# Patient Record
Sex: FEMALE | Race: WHITE | NOT HISPANIC OR LATINO | Employment: OTHER | URBAN - METROPOLITAN AREA
[De-identification: names, ages, dates, MRNs, and addresses within clinical notes are randomized per-mention and may not be internally consistent; named-entity substitution may affect disease eponyms.]

---

## 2023-08-31 PROBLEM — H26.9 CATARACT OF RIGHT EYE: Status: ACTIVE | Noted: 2020-11-12

## 2023-08-31 PROBLEM — H02.834 DERMATOCHALASIS OF LEFT UPPER EYELID: Status: ACTIVE | Noted: 2023-08-31

## 2023-08-31 PROBLEM — R51.9 HEADACHE: Status: ACTIVE | Noted: 2023-08-31

## 2023-08-31 PROBLEM — E03.9 HYPOTHYROID: Status: ACTIVE | Noted: 2022-03-09

## 2023-08-31 PROBLEM — E78.5 HYPERLIPIDEMIA: Status: ACTIVE | Noted: 2023-08-31

## 2023-08-31 PROBLEM — N95.9 MENOPAUSAL AND POSTMENOPAUSAL DISORDER: Status: ACTIVE | Noted: 2022-09-14

## 2023-08-31 PROBLEM — E55.9 VITAMIN D DEFICIENCY: Status: ACTIVE | Noted: 2020-08-25

## 2023-08-31 PROBLEM — Z91.89 AT RISK FOR BLEEDING: Status: ACTIVE | Noted: 2023-08-31

## 2023-08-31 PROBLEM — H02.831 DERMATOCHALASIS OF RIGHT UPPER EYELID: Status: ACTIVE | Noted: 2023-08-31

## 2023-08-31 PROBLEM — H53.469 HOMONYMOUS HEMIANOPIA: Status: ACTIVE | Noted: 2023-08-31

## 2023-08-31 PROBLEM — F41.9 ANXIETY: Status: ACTIVE | Noted: 2023-08-31

## 2023-08-31 PROBLEM — G47.33 OBSTRUCTIVE SLEEP APNEA SYNDROME: Status: ACTIVE | Noted: 2023-08-31

## 2023-08-31 PROBLEM — H26.40 SECONDARY CATARACT OF LEFT EYE: Status: ACTIVE | Noted: 2023-08-31

## 2023-08-31 PROBLEM — F32.A ANXIETY AND DEPRESSION: Status: ACTIVE | Noted: 2021-09-17

## 2023-08-31 PROBLEM — Z95.9 CARDIAC DEVICE IN SITU: Status: ACTIVE | Noted: 2023-08-31

## 2023-08-31 PROBLEM — R93.1 ELEVATED CORONARY ARTERY CALCIUM SCORE: Status: ACTIVE | Noted: 2023-08-31

## 2023-08-31 PROBLEM — E11.9 ABNORMAL METABOLIC STATE DUE TO DIABETES MELLITUS (MULTI): Status: ACTIVE | Noted: 2023-08-31

## 2023-08-31 PROBLEM — H52.7 REFRACTIVE ERRORS: Status: ACTIVE | Noted: 2023-08-31

## 2023-08-31 PROBLEM — R77.9 ELEVATED BLOOD PROTEIN: Status: ACTIVE | Noted: 2021-09-20

## 2023-08-31 PROBLEM — I47.10 SVT (SUPRAVENTRICULAR TACHYCARDIA) (CMS-HCC): Status: ACTIVE | Noted: 2023-08-31

## 2023-08-31 PROBLEM — G45.9 TIA (TRANSIENT ISCHEMIC ATTACK): Status: ACTIVE | Noted: 2023-08-31

## 2023-08-31 PROBLEM — E78.5 DYSLIPIDEMIA: Status: ACTIVE | Noted: 2023-08-31

## 2023-08-31 PROBLEM — F41.9 ANXIETY AND DEPRESSION: Status: ACTIVE | Noted: 2021-09-17

## 2023-08-31 PROBLEM — Z96.1 ARTIFICIAL LENS PRESENT: Status: ACTIVE | Noted: 2023-08-31

## 2023-08-31 PROBLEM — I10 BENIGN ESSENTIAL HYPERTENSION: Status: ACTIVE | Noted: 2023-08-31

## 2023-08-31 PROBLEM — H53.8 HAZY VISION: Status: ACTIVE | Noted: 2023-08-31

## 2023-08-31 PROBLEM — R06.2 WHEEZING: Status: ACTIVE | Noted: 2019-09-30

## 2023-08-31 PROBLEM — R73.09 BLOOD GLUCOSE ABNORMAL: Status: ACTIVE | Noted: 2023-08-31

## 2023-08-31 PROBLEM — E53.8 VITAMIN B 12 DEFICIENCY: Status: ACTIVE | Noted: 2022-03-09

## 2023-08-31 PROBLEM — I63.9 CRYPTOGENIC STROKE (MULTI): Status: ACTIVE | Noted: 2023-08-31

## 2023-08-31 PROBLEM — E11.9 DIABETES MELLITUS (MULTI): Status: ACTIVE | Noted: 2021-02-17

## 2023-08-31 PROBLEM — H53.40 LOSS OF PART OF VISUAL FIELD: Status: ACTIVE | Noted: 2023-08-31

## 2023-08-31 PROBLEM — E66.01 SEVERE OBESITY (BMI >= 40) (MULTI): Status: ACTIVE | Noted: 2023-08-31

## 2023-08-31 PROBLEM — J45.909 ASTHMA (HHS-HCC): Status: ACTIVE | Noted: 2023-08-31

## 2023-08-31 PROBLEM — E66.01 MORBID OBESITY (MULTI): Status: ACTIVE | Noted: 2023-08-31

## 2023-08-31 RX ORDER — SEMAGLUTIDE 2.68 MG/ML
2 INJECTION, SOLUTION SUBCUTANEOUS
COMMUNITY
Start: 2023-03-02 | End: 2023-11-21 | Stop reason: ALTCHOICE

## 2023-08-31 RX ORDER — ATORVASTATIN CALCIUM 80 MG/1
TABLET, FILM COATED ORAL
COMMUNITY
End: 2023-11-21 | Stop reason: ALTCHOICE

## 2023-08-31 RX ORDER — BUDESONIDE AND FORMOTEROL FUMARATE DIHYDRATE 160; 4.5 UG/1; UG/1
2 AEROSOL RESPIRATORY (INHALATION)
COMMUNITY
Start: 2023-03-02

## 2023-08-31 RX ORDER — TRIAMTERENE AND HYDROCHLOROTHIAZIDE 75; 50 MG/1; MG/1
1 TABLET ORAL DAILY
COMMUNITY
End: 2023-11-27 | Stop reason: SDUPTHER

## 2023-08-31 RX ORDER — ACETAMINOPHEN 500 MG
1 TABLET ORAL DAILY
COMMUNITY
Start: 2019-12-09 | End: 2023-11-10 | Stop reason: WASHOUT

## 2023-08-31 RX ORDER — ROSUVASTATIN CALCIUM 40 MG/1
1 TABLET, COATED ORAL DAILY
COMMUNITY
Start: 2022-11-08 | End: 2023-11-27 | Stop reason: SDUPTHER

## 2023-08-31 RX ORDER — ALBUTEROL SULFATE 90 UG/1
2 AEROSOL, METERED RESPIRATORY (INHALATION) 3 TIMES DAILY PRN
COMMUNITY
Start: 2023-03-02

## 2023-08-31 RX ORDER — ESCITALOPRAM OXALATE 10 MG/1
1 TABLET ORAL DAILY
COMMUNITY

## 2023-08-31 RX ORDER — SEMAGLUTIDE 1.34 MG/ML
INJECTION, SOLUTION SUBCUTANEOUS
COMMUNITY
End: 2023-11-21 | Stop reason: ALTCHOICE

## 2023-08-31 RX ORDER — LEVOTHYROXINE SODIUM 75 UG/1
1 TABLET ORAL DAILY
COMMUNITY

## 2023-08-31 RX ORDER — AMLODIPINE BESYLATE 5 MG/1
1 TABLET ORAL DAILY
COMMUNITY
End: 2023-11-27 | Stop reason: SDUPTHER

## 2023-08-31 RX ORDER — METFORMIN HYDROCHLORIDE 500 MG/1
2 TABLET ORAL DAILY
COMMUNITY

## 2023-08-31 RX ORDER — FLUTICASONE PROPIONATE AND SALMETEROL 250; 50 UG/1; UG/1
1 POWDER RESPIRATORY (INHALATION)
COMMUNITY
Start: 2019-12-09

## 2023-08-31 RX ORDER — OMEGA-3-ACID ETHYL ESTERS 1 G/1
1 CAPSULE, LIQUID FILLED ORAL 2 TIMES DAILY
COMMUNITY
End: 2023-11-10 | Stop reason: WASHOUT

## 2023-08-31 RX ORDER — CALCIUM CARB, CITRATE, MALATE 250 MG
2 CAPSULE ORAL DAILY
COMMUNITY

## 2023-08-31 RX ORDER — ACETAMINOPHEN 500 MG
TABLET ORAL
COMMUNITY

## 2023-08-31 RX ORDER — CLOPIDOGREL BISULFATE 75 MG/1
75 TABLET ORAL DAILY
COMMUNITY
Start: 2023-03-02

## 2023-09-19 ENCOUNTER — HOSPITAL ENCOUNTER (OUTPATIENT)
Dept: DATA CONVERSION | Facility: HOSPITAL | Age: 68
Discharge: HOME | End: 2023-09-19
Payer: MEDICARE

## 2023-09-19 DIAGNOSIS — E03.9 HYPOTHYROIDISM, UNSPECIFIED: ICD-10-CM

## 2023-09-19 DIAGNOSIS — I10 ESSENTIAL (PRIMARY) HYPERTENSION: ICD-10-CM

## 2023-09-19 DIAGNOSIS — E11.9 TYPE 2 DIABETES MELLITUS WITHOUT COMPLICATIONS (MULTI): ICD-10-CM

## 2023-09-19 LAB
ALBUMIN SERPL-MCNC: 4.2 GM/DL (ref 3.5–5)
ALBUMIN/GLOB SERPL: 1.4 RATIO (ref 1.5–3)
ALP BLD-CCNC: 86 U/L (ref 35–125)
ALT SERPL-CCNC: 20 U/L (ref 5–40)
ANION GAP SERPL CALCULATED.3IONS-SCNC: 14 MMOL/L (ref 0–19)
AST SERPL-CCNC: 24 U/L (ref 5–40)
BILIRUB SERPL-MCNC: 0.2 MG/DL (ref 0.1–1.2)
BUN SERPL-MCNC: 24 MG/DL (ref 8–25)
BUN/CREAT SERPL: 30 RATIO (ref 8–21)
CALCIUM SERPL-MCNC: 9.6 MG/DL (ref 8.5–10.4)
CHLORIDE SERPL-SCNC: 102 MMOL/L (ref 97–107)
CO2 SERPL-SCNC: 26 MMOL/L (ref 24–31)
CREAT SERPL-MCNC: 0.8 MG/DL (ref 0.4–1.6)
CREAT UR-MCNC: 216 MG/DL
GFR SERPL CREATININE-BSD FRML MDRD: 79 ML/MIN/1.73 M2
GLOBULIN SER-MCNC: 2.9 G/DL (ref 1.9–3.7)
GLUCOSE SERPL-MCNC: 109 MG/DL (ref 65–99)
HBA1C MFR BLD: 6 % (ref 4–6)
MAGNESIUM SERPL-MCNC: 2 MG/DL (ref 1.6–3.1)
MICROALBUMIN UR-MCNC: 17 MG/L (ref 0–23)
MICROALBUMIN/CREAT UR: 7.9 MG/G (ref 0–30)
POTASSIUM SERPL-SCNC: 4.4 MMOL/L (ref 3.4–5.1)
PROT SERPL-MCNC: 7.1 G/DL (ref 5.9–7.9)
SODIUM SERPL-SCNC: 141 MMOL/L (ref 133–145)
T3FREE SERPL-MCNC: 2.4 PG/ML (ref 2.3–4.1)
T4 FREE SERPL-MCNC: 1.6 NG/DL (ref 0.9–1.7)
TSH SERPL DL<=0.05 MIU/L-ACNC: 1.65 MIU/L (ref 0.27–4.2)

## 2023-10-03 ENCOUNTER — HOSPITAL ENCOUNTER (OUTPATIENT)
Dept: CARDIOLOGY | Facility: CLINIC | Age: 68
Discharge: HOME | End: 2023-10-03
Payer: MEDICARE

## 2023-10-03 PROCEDURE — 93298 REM INTERROG DEV EVAL SCRMS: CPT

## 2023-10-03 PROCEDURE — 93298 REM INTERROG DEV EVAL SCRMS: CPT | Performed by: INTERNAL MEDICINE

## 2023-11-07 ENCOUNTER — HOSPITAL ENCOUNTER (OUTPATIENT)
Dept: CARDIOLOGY | Facility: CLINIC | Age: 68
Discharge: HOME | End: 2023-11-07
Payer: MEDICARE

## 2023-11-10 ENCOUNTER — APPOINTMENT (OUTPATIENT)
Dept: OPHTHALMOLOGY | Facility: CLINIC | Age: 68
End: 2023-11-10
Payer: MEDICARE

## 2023-11-10 ENCOUNTER — CLINICAL SUPPORT (OUTPATIENT)
Dept: OPHTHALMOLOGY | Facility: CLINIC | Age: 68
End: 2023-11-10
Payer: MEDICARE

## 2023-11-10 ENCOUNTER — OFFICE VISIT (OUTPATIENT)
Dept: OPHTHALMOLOGY | Facility: CLINIC | Age: 68
End: 2023-11-10
Payer: MEDICARE

## 2023-11-10 DIAGNOSIS — H52.7 REFRACTIVE ERRORS: ICD-10-CM

## 2023-11-10 DIAGNOSIS — H26.492 OTHER SECONDARY CATARACT OF LEFT EYE: ICD-10-CM

## 2023-11-10 DIAGNOSIS — E11.9 ABNORMAL METABOLIC STATE DUE TO DIABETES MELLITUS (MULTI): Primary | ICD-10-CM

## 2023-11-10 DIAGNOSIS — Z96.1 ARTIFICIAL LENS PRESENT: ICD-10-CM

## 2023-11-10 PROCEDURE — 92325 MODIFICATION OF CONTACT LENS: CPT | Performed by: OPHTHALMOLOGY

## 2023-11-10 PROCEDURE — 99214 OFFICE O/P EST MOD 30 MIN: CPT | Performed by: OPHTHALMOLOGY

## 2023-11-10 PROCEDURE — 92015 DETERMINE REFRACTIVE STATE: CPT | Performed by: OPHTHALMOLOGY

## 2023-11-10 ASSESSMENT — ENCOUNTER SYMPTOMS
LOSS OF SENSATION IN FEET: 0
NEUROLOGICAL NEGATIVE: 0
CONSTITUTIONAL NEGATIVE: 0
CARDIOVASCULAR NEGATIVE: 0
HEMATOLOGIC/LYMPHATIC NEGATIVE: 0
EYES NEGATIVE: 0
MUSCULOSKELETAL NEGATIVE: 0
DEPRESSION: 0
ENDOCRINE NEGATIVE: 0
RESPIRATORY NEGATIVE: 0
GASTROINTESTINAL NEGATIVE: 0
ALLERGIC/IMMUNOLOGIC NEGATIVE: 0
PSYCHIATRIC NEGATIVE: 0
OCCASIONAL FEELINGS OF UNSTEADINESS: 0

## 2023-11-10 ASSESSMENT — REFRACTION_MANIFEST
OD_AXIS: 005
METHOD_AUTOREFRACTION: 1
OD_CYLINDER: -0.50
OS_CYLINDER: -0.75
OS_AXIS: 175
OD_SPHERE: -4.50
OS_SPHERE: -2.50

## 2023-11-10 ASSESSMENT — KERATOMETRY
OS_AXISANGLE_DEGREES: 80
OD_AXISANGLE2_DEGREES: 5
OS_K2POWER_DIOPTERS: 48.00
OD_K1POWER_DIOPTERS: 47.00
OD_AXISANGLE_DEGREES: 95
OD_K2POWER_DIOPTERS: 47.25
METHOD_AUTO_MANUAL: AUTOMATED
OS_K1POWER_DIOPTERS: 47.25
OS_AXISANGLE2_DEGREES: 170

## 2023-11-10 ASSESSMENT — PATIENT HEALTH QUESTIONNAIRE - PHQ9
SUM OF ALL RESPONSES TO PHQ9 QUESTIONS 1 AND 2: 0
1. LITTLE INTEREST OR PLEASURE IN DOING THINGS: NOT AT ALL
2. FEELING DOWN, DEPRESSED OR HOPELESS: NOT AT ALL

## 2023-11-10 ASSESSMENT — REFRACTION_CURRENTRX
OD_BRAND: ACUVUE 2
OD_BASECURVE: 8.3
OD_DIAMETER: 14.0
OD_SPHERE: -3.50

## 2023-11-10 ASSESSMENT — TONOMETRY
OS_IOP_MMHG: 14
OD_IOP_MMHG: 14
IOP_METHOD: GOLDMANN APPLANATION

## 2023-11-10 ASSESSMENT — PAIN SCALES - GENERAL: PAINLEVEL: 0-NO PAIN

## 2023-11-10 ASSESSMENT — VISUAL ACUITY
CORRECTION_TYPE: CONTACTS
OD_CC: 20/20
METHOD: SNELLEN - SINGLE
OS_SC: J1+

## 2023-11-10 ASSESSMENT — EXTERNAL EXAM - LEFT EYE: OS_EXAM: NORMAL

## 2023-11-10 ASSESSMENT — LEVATOR FUNCTION
OS_LEVATOR: 15
OD_LEVATOR: 15

## 2023-11-10 ASSESSMENT — MARGIN REFLEX DISTANCE
OD_MRD1: 3.0
OS_MRD1: 3.0

## 2023-11-10 ASSESSMENT — REFRACTION_WEARINGRX
OS_CYLINDER: -0.75
OD_SPHERE: -3.50
OS_SPHERE: -2.25
OS_AXIS: 175
OD_CYLINDER: -0.75
OD_AXIS: 007

## 2023-11-10 ASSESSMENT — SLIT LAMP EXAM - LIDS
COMMENTS: NORMAL
COMMENTS: NORMAL

## 2023-11-10 ASSESSMENT — CUP TO DISC RATIO
OS_RATIO: 0.35
OD_RATIO: 0.4

## 2023-11-10 ASSESSMENT — EXTERNAL EXAM - RIGHT EYE: OD_EXAM: NORMAL

## 2023-11-10 NOTE — LETTER
November 10, 2023    Char Lew MD  [address]  [place] [address]    Patient: Uyen Naranjo   YOB: 1955   Date of Visit: 11/10/2023       Dear Dr. Char Lew MD:    Thank you for referring Uyen Naranjo to me for evaluation. Here is my assessment and plan of care:    Assessment/Plan:  Uyen was seen today for annual exam.  Diagnoses and all orders for this visit:  Abnormal metabolic state due to diabetes mellitus (CMS/HCC) (Primary)  Other secondary cataract of left eye  Artificial lens present  Refractive errors    Right eye:     Left eye:    [x] No diabetes mellitus (DM) retinopathy [x] No diabetes mellitus (DM) retinopathy    [] Mild non-proliferative retinopathy [] Mild non-proliferative retinopathy    [] Moderate non-proliferative retinopathy [] Moderate non-proliferative retinopathy    [] Severe non-proliferative retinopathy [] Severe non-proliferative retinopathy    [] Proliferative retinopathy   [] Proliferative retinopathy    [] Diabetic macular edema   [] Diabetic macular edema    Urged patient to continue to work on best blood sugar and blood pressure control  Advised patient to call if any new vision changes noted    Will plan to repeat evaluation in:   [] 3 months [] 6 months [] 9 months [x] 12 months [] Other      Below you can find relevant pieces of the exam. If you have questions, please do not hesitate to call me. I look forward to following Uyen along with you.         Sincerely,        Roverto Syed MD        CC:   No Recipients         External Exam         Right Left    External Normal Normal    Palpebral fissure 7 mm 7 mm    MRD1 3.0 mm 3.0 mm    Levator 15 mm 15 mm              Slit Lamp Exam         Right Left    Lids/Lashes Normal Normal    Conjunctiva/Sclera White and quiet White and quiet    Cornea Clear Clear    Anterior Chamber Deep and quiet Deep and quiet    Iris Round and reactive Round and reactive    Lens S/P  "YAG capsulotomy, Posterior chamber intraocular lens cloudy posterior capsule, Posterior chamber intraocular lens              Fundus Exam         Right Left    Vitreous vitreous clear and normal vitreous clear and normal    Disc Normal Normal    C/D Ratio 0.4 0.35    Macula no diabetic retinopathy no diabetic retinopathy    Vessels no diabetic retinopathy no diabetic retinopathy    Periphery normal retinal periphery normal retinal periphery                   <div id=\"MAIN_EXAM_REVIEWED\"></div>     "

## 2023-11-10 NOTE — PROGRESS NOTES
Assessment/Plan   Problem List Items Addressed This Visit          Eye/Vision problems    Secondary cataract of left eye     Mild PCO left eye (OS) but non significant, will monitor with serial exam.          Refractive errors     Discussed glasses prescription from refraction. Will provide if patient interested in keeping for records or to fill as a new set of glasses.            Abnormal metabolic state due to diabetes mellitus (CMS/[facility]) - Primary     Advised no signs of diabetic changes on exam. Recommend to continue best sugar control and on need for annual checkup. Understands role of good BP control and weight loss if applicable. Discussed some components of selected studies like WESDR, DCCT, and UKPDS to describe the likely course of diabetes and effects on the eyes.           Artificial lens present     Stable intraocular lens (IOL) both eyes (OU), well treated PCO right eye (OD) with YAG, mild PCO left eye (OS). Will monitor with serial exam.             Provided reassurance regarding above diagnoses and care received in the office visit today. Discussed outcomes and options along with the importance of treatment compliance. Understands the importance of any follow up visits. Patient instructed to call/communicate with our office if any new issues, questions, or concerns.     Will plan to see back in 1 year for full or sooner PRN

## 2023-11-10 NOTE — PATIENT INSTRUCTIONS
Thank you so much for choosing me to provide your care today!    If you were dilated your vision may remain blurry   or light sensitive for several hours.    The nature of eye and vision problems can require frequent follow up, please make every effort to adhere to any future appointments.    If you have any issues, questions, or concerns,   please do not hesitate to reach out.    If you receive a survey in regards to your care today, please mention any exceptional care my office staff and/or technicians provided.    You can reach our office at this number:  [phone]

## 2023-11-21 ENCOUNTER — OFFICE VISIT (OUTPATIENT)
Dept: CARDIOLOGY | Facility: CLINIC | Age: 68
End: 2023-11-21
Payer: MEDICARE

## 2023-11-21 VITALS
HEART RATE: 84 BPM | DIASTOLIC BLOOD PRESSURE: 70 MMHG | BODY MASS INDEX: 44.14 KG/M2 | OXYGEN SATURATION: 96 % | WEIGHT: 226 LBS | SYSTOLIC BLOOD PRESSURE: 120 MMHG

## 2023-11-21 DIAGNOSIS — E78.2 MIXED HYPERLIPIDEMIA: ICD-10-CM

## 2023-11-21 DIAGNOSIS — I10 BENIGN ESSENTIAL HYPERTENSION: ICD-10-CM

## 2023-11-21 DIAGNOSIS — I63.9 CRYPTOGENIC STROKE (MULTI): ICD-10-CM

## 2023-11-21 DIAGNOSIS — R93.1 ELEVATED CORONARY ARTERY CALCIUM SCORE: Primary | ICD-10-CM

## 2023-11-21 PROCEDURE — 3078F DIAST BP <80 MM HG: CPT | Performed by: INTERNAL MEDICINE

## 2023-11-21 PROCEDURE — 1126F AMNT PAIN NOTED NONE PRSNT: CPT | Performed by: INTERNAL MEDICINE

## 2023-11-21 PROCEDURE — 3044F HG A1C LEVEL LT 7.0%: CPT | Performed by: INTERNAL MEDICINE

## 2023-11-21 PROCEDURE — 99213 OFFICE O/P EST LOW 20 MIN: CPT | Performed by: INTERNAL MEDICINE

## 2023-11-21 PROCEDURE — 1160F RVW MEDS BY RX/DR IN RCRD: CPT | Performed by: INTERNAL MEDICINE

## 2023-11-21 PROCEDURE — 1036F TOBACCO NON-USER: CPT | Performed by: INTERNAL MEDICINE

## 2023-11-21 PROCEDURE — 3074F SYST BP LT 130 MM HG: CPT | Performed by: INTERNAL MEDICINE

## 2023-11-21 PROCEDURE — 1159F MED LIST DOCD IN RCRD: CPT | Performed by: INTERNAL MEDICINE

## 2023-11-21 ASSESSMENT — PAIN SCALES - GENERAL: PAINLEVEL: 0-NO PAIN

## 2023-11-21 ASSESSMENT — ENCOUNTER SYMPTOMS
DYSPNEA ON EXERTION: 0
ABDOMINAL PAIN: 0
COUGH: 0
HEMATURIA: 0
BLURRED VISION: 0
SHORTNESS OF BREATH: 0
NUMBNESS: 0
PARESTHESIAS: 0
DYSURIA: 0
PALPITATIONS: 0

## 2023-11-21 NOTE — PROGRESS NOTES
Subjective   Uyen Naranjo is a 68 y.o. female.    Chief Complaint:  Follow-up (Crestor refill to [facility] - 90)    HPI  Feels very good.  No palpitations, no chest pains.  Active.  Review of Systems   Constitutional: Negative for malaise/fatigue.   HENT:  Negative for congestion.    Eyes:  Negative for blurred vision.   Cardiovascular:  Negative for chest pain, dyspnea on exertion and palpitations.   Respiratory:  Negative for cough and shortness of breath.    Musculoskeletal:  Negative for joint pain.   Gastrointestinal:  Negative for abdominal pain.   Genitourinary:  Negative for dysuria and hematuria.   Neurological:  Negative for numbness and paresthesias.       Objective   Constitutional:       Appearance: Not in distress.   Eyes:      Conjunctiva/sclera: Conjunctivae normal.   Neck:      Vascular: JVD normal.   Pulmonary:      Breath sounds: Normal breath sounds. No wheezing. No rhonchi. No rales.   Cardiovascular:      Normal rate. Regular rhythm.      Murmurs: There is no murmur.      No gallop.  No click. No rub.   Abdominal:      Palpations: Abdomen is soft.   Neurological:      General: No focal deficit present.      Mental Status: Alert.                 Lab Review:       Assessment/Plan   The primary encounter diagnosis was Elevated coronary artery calcium score. Diagnoses of Benign essential hypertension, Mixed hyperlipidemia, and Cryptogenic stroke (CMS/HCC) were also pertinent to this visit.    Cryptogenic stroke (CMS/HCC)  Dr. Dodd did implant LINQ monitor.    Elevated coronary artery calcium score  Continue standard risk factor modification    Hyperlipidemia  LDL 51 on last check, very good.  Recheck on return.

## 2023-11-27 DIAGNOSIS — I10 BENIGN ESSENTIAL HYPERTENSION: ICD-10-CM

## 2023-11-27 DIAGNOSIS — E78.2 MIXED HYPERLIPIDEMIA: Primary | ICD-10-CM

## 2023-11-27 RX ORDER — TRIAMTERENE AND HYDROCHLOROTHIAZIDE 75; 50 MG/1; MG/1
1 TABLET ORAL DAILY
Qty: 90 TABLET | Refills: 3 | Status: SHIPPED | OUTPATIENT
Start: 2023-11-27

## 2023-11-27 RX ORDER — ROSUVASTATIN CALCIUM 40 MG/1
40 TABLET, COATED ORAL DAILY
Qty: 90 TABLET | Refills: 3 | Status: SHIPPED | OUTPATIENT
Start: 2023-11-27

## 2023-11-27 RX ORDER — AMLODIPINE BESYLATE 5 MG/1
5 TABLET ORAL DAILY
Qty: 90 TABLET | Refills: 3 | Status: SHIPPED | OUTPATIENT
Start: 2023-11-27

## 2023-12-05 ENCOUNTER — APPOINTMENT (OUTPATIENT)
Dept: RADIOLOGY | Facility: HOSPITAL | Age: 68
End: 2023-12-05
Payer: MEDICARE

## 2023-12-21 ENCOUNTER — HOSPITAL ENCOUNTER (OUTPATIENT)
Dept: RADIOLOGY | Facility: HOSPITAL | Age: 68
Discharge: HOME | End: 2023-12-21
Payer: MEDICARE

## 2023-12-21 VITALS — BODY MASS INDEX: 41.91 KG/M2 | HEIGHT: 61 IN | WEIGHT: 222 LBS

## 2023-12-21 DIAGNOSIS — Z12.31 ENCOUNTER FOR SCREENING MAMMOGRAM FOR MALIGNANT NEOPLASM OF BREAST: ICD-10-CM

## 2023-12-21 PROCEDURE — 77063 BREAST TOMOSYNTHESIS BI: CPT

## 2023-12-21 PROCEDURE — 77067 SCR MAMMO BI INCL CAD: CPT

## 2024-02-22 NOTE — ASSESSMENT & PLAN NOTE
Stable intraocular lens (IOL) both eyes (OU), well treated PCO right eye (OD) with YAG, mild PCO left eye (OS). Will monitor with serial exam.    Cell Phone/PDA (specify)/Clothing

## 2024-04-05 ENCOUNTER — HOSPITAL ENCOUNTER (EMERGENCY)
Facility: HOSPITAL | Age: 69
Discharge: HOME | End: 2024-04-05
Attending: EMERGENCY MEDICINE
Payer: MEDICARE

## 2024-04-05 VITALS
HEART RATE: 80 BPM | RESPIRATION RATE: 17 BRPM | TEMPERATURE: 97.9 F | SYSTOLIC BLOOD PRESSURE: 138 MMHG | BODY MASS INDEX: 44.02 KG/M2 | WEIGHT: 224.21 LBS | HEIGHT: 60 IN | OXYGEN SATURATION: 98 % | DIASTOLIC BLOOD PRESSURE: 80 MMHG

## 2024-04-05 DIAGNOSIS — M43.6 TORTICOLLIS, ACUTE: Primary | ICD-10-CM

## 2024-04-05 PROCEDURE — 96372 THER/PROPH/DIAG INJ SC/IM: CPT | Performed by: EMERGENCY MEDICINE

## 2024-04-05 PROCEDURE — 99283 EMERGENCY DEPT VISIT LOW MDM: CPT

## 2024-04-05 PROCEDURE — 2500000005 HC RX 250 GENERAL PHARMACY W/O HCPCS: Performed by: EMERGENCY MEDICINE

## 2024-04-05 PROCEDURE — 2500000004 HC RX 250 GENERAL PHARMACY W/ HCPCS (ALT 636 FOR OP/ED): Performed by: EMERGENCY MEDICINE

## 2024-04-05 RX ORDER — ONDANSETRON 4 MG/1
4 TABLET, ORALLY DISINTEGRATING ORAL ONCE
Status: COMPLETED | OUTPATIENT
Start: 2024-04-05 | End: 2024-04-05

## 2024-04-05 RX ORDER — ORPHENADRINE CITRATE 30 MG/ML
30 INJECTION INTRAMUSCULAR; INTRAVENOUS ONCE
Status: COMPLETED | OUTPATIENT
Start: 2024-04-05 | End: 2024-04-05

## 2024-04-05 RX ORDER — METHOCARBAMOL 500 MG/1
500 TABLET, FILM COATED ORAL 3 TIMES DAILY PRN
Qty: 20 TABLET | Refills: 0 | Status: SHIPPED | OUTPATIENT
Start: 2024-04-05 | End: 2024-05-08 | Stop reason: ALTCHOICE

## 2024-04-05 RX ORDER — KETOROLAC TROMETHAMINE 30 MG/ML
15 INJECTION, SOLUTION INTRAMUSCULAR; INTRAVENOUS ONCE
Status: COMPLETED | OUTPATIENT
Start: 2024-04-05 | End: 2024-04-05

## 2024-04-05 RX ORDER — LIDOCAINE 560 MG/1
1 PATCH PERCUTANEOUS; TOPICAL; TRANSDERMAL ONCE
Status: DISCONTINUED | OUTPATIENT
Start: 2024-04-05 | End: 2024-04-05 | Stop reason: HOSPADM

## 2024-04-05 RX ADMIN — ONDANSETRON 4 MG: 4 TABLET, ORALLY DISINTEGRATING ORAL at 20:55

## 2024-04-05 RX ADMIN — KETOROLAC TROMETHAMINE 15 MG: 30 INJECTION, SOLUTION INTRAMUSCULAR at 20:52

## 2024-04-05 RX ADMIN — LIDOCAINE 1 PATCH: 4 PATCH TOPICAL at 20:53

## 2024-04-05 RX ADMIN — ORPHENADRINE CITRATE 30 MG: 60 INJECTION INTRAMUSCULAR; INTRAVENOUS at 20:52

## 2024-04-05 ASSESSMENT — PAIN DESCRIPTION - FREQUENCY: FREQUENCY: CONSTANT/CONTINUOUS

## 2024-04-05 ASSESSMENT — PAIN SCALES - GENERAL
PAINLEVEL_OUTOF10: 4
PAINLEVEL_OUTOF10: 7

## 2024-04-05 ASSESSMENT — COLUMBIA-SUICIDE SEVERITY RATING SCALE - C-SSRS
1. IN THE PAST MONTH, HAVE YOU WISHED YOU WERE DEAD OR WISHED YOU COULD GO TO SLEEP AND NOT WAKE UP?: NO
6. HAVE YOU EVER DONE ANYTHING, STARTED TO DO ANYTHING, OR PREPARED TO DO ANYTHING TO END YOUR LIFE?: NO
2. HAVE YOU ACTUALLY HAD ANY THOUGHTS OF KILLING YOURSELF?: NO

## 2024-04-05 ASSESSMENT — PAIN DESCRIPTION - LOCATION: LOCATION: NECK

## 2024-04-05 ASSESSMENT — PAIN DESCRIPTION - ORIENTATION: ORIENTATION: RIGHT

## 2024-04-05 ASSESSMENT — PAIN DESCRIPTION - DESCRIPTORS: DESCRIPTORS: ACHING

## 2024-04-05 ASSESSMENT — PAIN - FUNCTIONAL ASSESSMENT
PAIN_FUNCTIONAL_ASSESSMENT: 0-10
PAIN_FUNCTIONAL_ASSESSMENT: 0-10

## 2024-04-05 ASSESSMENT — PAIN DESCRIPTION - PAIN TYPE: TYPE: ACUTE PAIN

## 2024-04-05 ASSESSMENT — PAIN DESCRIPTION - PROGRESSION: CLINICAL_PROGRESSION: GRADUALLY WORSENING

## 2024-04-05 ASSESSMENT — PAIN DESCRIPTION - ONSET: ONSET: SUDDEN

## 2024-04-06 NOTE — ED PROVIDER NOTES
HPI   Chief Complaint   Patient presents with    Neck Pain     Pt has had a stiff neck since Wednesday night. Pt on Thursday had to slam on her brakes and it tweaked her neck more. Pt now feels dizzy whenever she stands up.       HPI       69-year-old female with right-sided neck pain.  Patient woke up from a nap on Wednesday with tightness and pain from the base of the skull on the right side down towards the shoulder.  On Thursday she states that she had a several break suddenly but did not have any pain or discomfort with that.  Over the course of the last 24 hours had increasing pain and spasm to the right neck.  States she cannot get comfortable.  Has tried ibuprofen heat and Biofreeze with no relief.  No radiation of pain.  No paresthesias.  No headache             No data recorded                   Patient History   Past Medical History:   Diagnosis Date    Dermatochalasis of both upper eyelids     Diabetes mellitus without complication (CMS/HCC)     Disorder of lipoprotein metabolism, unspecified     Elevated serum cholesterol    History of YAG laser capsulotomy of lens of right eye         Personal history of other diseases of the circulatory system     History of hypertension    Personal history of other diseases of the nervous system and sense organs     History of sleep apnea    Personal history of other endocrine, nutritional and metabolic disease     History of diabetes mellitus    Posterior capsular opacification, left eye     Unspecified disorder of refraction      Past Surgical History:   Procedure Laterality Date    CATARACT EXTRACTION W/  INTRAOCULAR LENS IMPLANT Bilateral     OD 2007 +14.5D, OS 2020 +16.5D    MR HEAD ANGIO WO IV CONTRAST  2020    MR HEAD ANGIO WO IV CONTRAST [facility]    OTHER SURGICAL HISTORY  2021     section    OTHER SURGICAL HISTORY  2022    Foot surgery    OTHER SURGICAL HISTORY  2022    Tubal ligation    OTHER  SURGICAL HISTORY  2022    Eye surgery    OTHER SURGICAL HISTORY  2022    Leg surgery    OTHER SURGICAL HISTORY  2022    Hip surgery     Family History   Problem Relation Name Age of Onset    Cancer Mother Lisseth Albert     Colon cancer Mother Lisseth Albert     Heart disease Father      Kidney disease Father      Cancer Father       Social History     Tobacco Use    Smoking status: Former     Packs/day: 1     Types: Cigarettes     Quit date: 2009     Years since quittin.8    Smokeless tobacco: Never   Vaping Use    Vaping Use: Never used   Substance Use Topics    Alcohol use: Yes    Drug use: Yes     Types: Marijuana       Physical Exam   ED Triage Vitals [24]   Temperature Heart Rate Respirations BP   36.6 °C (97.9 °F) 98 16 152/80      Pulse Ox Temp Source Heart Rate Source Patient Position   96 % Temporal Monitor Sitting      BP Location FiO2 (%)     Right arm --       Physical Exam    Gen:  Well nourished, no acute distress  Head: Normocephalic, atraumatic  Eyes: PERRL, EOMI, conjunctiva clear  ENT: External ears and nose normal, OP clear, Mucosa moist  Neck: Has pain with lateral movements.  Tenderness and spasm right paracervical and trapezius muscles, no focal midline tenderness step-off or deformity  Chest: No tenderness, no crepitus  CV: Regular rate and rhythm, no Murmur  Lungs: Clear bilaterally, no distress  Abd: No tenderness, no rebound or guarding  Extremities: FROM, no edema  Neuro: Cranial nerves intact, moving all 4 extremities, A&O x 4, strength out of 5 in all 4 extremities, equal  strength, normal sensation to soft touch  Psych: Appropriate behavior and affect  Back: No focal tenderness, no CVA tenderness  Skin: No rashes or lesions noted    ED Course & MDM   Diagnoses as of 24   Torticollis, acute     Received IM Toradol and Norflex here as well as Lidoderm patch.  On reevaluation states she is feeling better.  Has some more movement of  the neck.  Will discharge home with anti-inflammatories.  Continue NSAIDs and heat to the area.  No signs of cervical spine injury, arterial injury or fracture.  Discharge home and follow-up with PCP  Medical Decision Making      Procedure  Procedures     Ahmet Alejandro MD  04/05/24 [id number]

## 2024-04-06 NOTE — DISCHARGE INSTRUCTIONS
Heat and gentle stretching at home.  Ibuprofen or Aleve and Tylenol as needed.  Return to ER for new or worsening symptoms

## 2024-04-25 ENCOUNTER — LAB (OUTPATIENT)
Dept: LAB | Facility: LAB | Age: 69
End: 2024-04-25
Payer: MEDICARE

## 2024-04-25 DIAGNOSIS — E78.2 MIXED HYPERLIPIDEMIA: ICD-10-CM

## 2024-04-25 LAB
CHOLEST SERPL-MCNC: 155 MG/DL (ref 133–200)
CHOLEST/HDLC SERPL: 2.1 {RATIO}
HDLC SERPL-MCNC: 75 MG/DL
LDLC SERPL CALC-MCNC: 55 MG/DL (ref 65–130)
TRIGL SERPL-MCNC: 124 MG/DL (ref 40–150)

## 2024-04-25 PROCEDURE — 80061 LIPID PANEL: CPT

## 2024-04-25 PROCEDURE — 36415 COLL VENOUS BLD VENIPUNCTURE: CPT

## 2024-05-03 ENCOUNTER — HOSPITAL ENCOUNTER (OUTPATIENT)
Dept: CARDIOLOGY | Facility: CLINIC | Age: 69
Discharge: HOME | End: 2024-05-03
Payer: MEDICARE

## 2024-05-03 DIAGNOSIS — Z45.09 ENCOUNTER FOR LOOP RECORDER CHECK: ICD-10-CM

## 2024-05-03 DIAGNOSIS — I63.9 CRYPTOGENIC STROKE (MULTI): ICD-10-CM

## 2024-05-07 PROBLEM — E78.5 DYSLIPIDEMIA: Status: RESOLVED | Noted: 2023-08-31 | Resolved: 2024-05-07

## 2024-05-08 ENCOUNTER — OFFICE VISIT (OUTPATIENT)
Dept: CARDIOLOGY | Facility: CLINIC | Age: 69
End: 2024-05-08
Payer: MEDICARE

## 2024-05-08 VITALS
DIASTOLIC BLOOD PRESSURE: 60 MMHG | WEIGHT: 221 LBS | SYSTOLIC BLOOD PRESSURE: 100 MMHG | HEART RATE: 84 BPM | BODY MASS INDEX: 43.16 KG/M2

## 2024-05-08 DIAGNOSIS — I10 BENIGN ESSENTIAL HYPERTENSION: ICD-10-CM

## 2024-05-08 DIAGNOSIS — I47.10 SVT (SUPRAVENTRICULAR TACHYCARDIA) (CMS-HCC): ICD-10-CM

## 2024-05-08 DIAGNOSIS — Z95.9 CARDIAC DEVICE IN SITU: ICD-10-CM

## 2024-05-08 DIAGNOSIS — I63.9 CRYPTOGENIC STROKE (MULTI): ICD-10-CM

## 2024-05-08 DIAGNOSIS — R93.1 ELEVATED CORONARY ARTERY CALCIUM SCORE: Primary | ICD-10-CM

## 2024-05-08 DIAGNOSIS — E78.2 MIXED HYPERLIPIDEMIA: ICD-10-CM

## 2024-05-08 PROCEDURE — 3048F LDL-C <100 MG/DL: CPT | Performed by: INTERNAL MEDICINE

## 2024-05-08 PROCEDURE — 1159F MED LIST DOCD IN RCRD: CPT | Performed by: INTERNAL MEDICINE

## 2024-05-08 PROCEDURE — 99214 OFFICE O/P EST MOD 30 MIN: CPT | Performed by: INTERNAL MEDICINE

## 2024-05-08 PROCEDURE — 1036F TOBACCO NON-USER: CPT | Performed by: INTERNAL MEDICINE

## 2024-05-08 PROCEDURE — 3078F DIAST BP <80 MM HG: CPT | Performed by: INTERNAL MEDICINE

## 2024-05-08 PROCEDURE — 1157F ADVNC CARE PLAN IN RCRD: CPT | Performed by: INTERNAL MEDICINE

## 2024-05-08 PROCEDURE — 1126F AMNT PAIN NOTED NONE PRSNT: CPT | Performed by: INTERNAL MEDICINE

## 2024-05-08 PROCEDURE — 3074F SYST BP LT 130 MM HG: CPT | Performed by: INTERNAL MEDICINE

## 2024-05-08 ASSESSMENT — ENCOUNTER SYMPTOMS
BLURRED VISION: 0
NUMBNESS: 0
HEMATURIA: 0
ABDOMINAL PAIN: 0
PALPITATIONS: 0
SHORTNESS OF BREATH: 0
COUGH: 0
PARESTHESIAS: 0
DYSPNEA ON EXERTION: 0
DYSURIA: 0

## 2024-05-08 ASSESSMENT — PAIN SCALES - GENERAL: PAINLEVEL: 0-NO PAIN

## 2024-05-08 NOTE — PROGRESS NOTES
Subjective   Uyen Naranjo is a 69 y.o. female.    Chief Complaint:  Elevated coronary artery calcium score (6 month f/u)    HPI  Overall feels great.  Couple weeks ago she did have a slight twinge in her left chest that only lasted for a second.  No other recurrences overall she feels quite well.    Review of Systems   Constitutional: Negative for malaise/fatigue.   HENT:  Negative for congestion.    Eyes:  Negative for blurred vision.   Cardiovascular:  Negative for chest pain, dyspnea on exertion and palpitations.   Respiratory:  Negative for cough and shortness of breath.    Musculoskeletal:  Negative for joint pain.   Gastrointestinal:  Negative for abdominal pain.   Genitourinary:  Negative for dysuria and hematuria.   Neurological:  Negative for numbness and paresthesias.       Objective   Constitutional:       Appearance: Not in distress.   Eyes:      Conjunctiva/sclera: Conjunctivae normal.   Neck:      Vascular: JVD normal.   Pulmonary:      Breath sounds: Normal breath sounds. No wheezing. No rhonchi. No rales.   Cardiovascular:      Normal rate. Regular rhythm.      Murmurs: There is no murmur.      No gallop.  No click. No rub.   Abdominal:      Palpations: Abdomen is soft.   Neurological:      General: No focal deficit present.      Mental Status: Alert.         Lab Review:   Lab on 04/25/2024   Component Date Value    Cholesterol 04/25/2024 155     HDL-Cholesterol 04/25/2024 75.0     Cholesterol/HDL Ratio 04/25/2024 2.1     LDL Calculated 04/25/2024 55 (L)     Triglycerides 04/25/2024 124        Assessment/Plan   The primary encounter diagnosis was Elevated coronary artery calcium score. Diagnoses of Mixed hyperlipidemia, SVT (supraventricular tachycardia) (CMS-HCC), Benign essential hypertension, Cardiac device in situ, and Cryptogenic stroke (Multi) were also pertinent to this visit.    Benign essential hypertension  Blood pressure very well-controlled.  No changes need to be made at this  time.    Elevated coronary artery calcium score  Stable with no anginal type symptoms.  Will continue standard risk factor modification and follow on a clinical basis.    Hyperlipidemia  Reviewed lipid panel.  LDL cholesterol all the way down to 55, excellent.  Continue the rosuvastatin at 40 mg daily.  Plan on rechecking lipid panel approximately 1 year.    SVT (supraventricular tachycardia) (CMS-HCC)  No symptomatic palpitations.  Will continue to follow clinically.    Cryptogenic stroke (Multi)  Continue clopidogrel and follow clinically.

## 2024-05-08 NOTE — ASSESSMENT & PLAN NOTE
Stable with no anginal type symptoms.  Will continue standard risk factor modification and follow on a clinical basis.

## 2024-05-08 NOTE — ASSESSMENT & PLAN NOTE
Reviewed lipid panel.  LDL cholesterol all the way down to 55, excellent.  Continue the rosuvastatin at 40 mg daily.  Plan on rechecking lipid panel approximately 1 year.

## 2024-06-10 ENCOUNTER — HOSPITAL ENCOUNTER (OUTPATIENT)
Dept: CARDIOLOGY | Facility: CLINIC | Age: 69
Discharge: HOME | End: 2024-06-10
Payer: MEDICARE

## 2024-06-10 DIAGNOSIS — Z45.09 ENCOUNTER FOR LOOP RECORDER CHECK: ICD-10-CM

## 2024-06-10 DIAGNOSIS — I63.9 CRYPTOGENIC STROKE (MULTI): ICD-10-CM

## 2024-09-10 ENCOUNTER — TELEPHONE (OUTPATIENT)
Dept: OTOLARYNGOLOGY | Facility: CLINIC | Age: 69
End: 2024-09-10
Payer: MEDICARE

## 2024-09-11 DIAGNOSIS — E78.2 MIXED HYPERLIPIDEMIA: ICD-10-CM

## 2024-09-11 DIAGNOSIS — I10 BENIGN ESSENTIAL HYPERTENSION: ICD-10-CM

## 2024-09-12 RX ORDER — AMLODIPINE BESYLATE 5 MG/1
5 TABLET ORAL DAILY
Qty: 90 TABLET | Refills: 3 | Status: SHIPPED | OUTPATIENT
Start: 2024-09-12

## 2024-09-12 RX ORDER — ROSUVASTATIN CALCIUM 40 MG/1
40 TABLET, COATED ORAL DAILY
Qty: 90 TABLET | Refills: 3 | Status: SHIPPED | OUTPATIENT
Start: 2024-09-12 | End: 2025-09-12

## 2024-11-15 ENCOUNTER — APPOINTMENT (OUTPATIENT)
Dept: OPHTHALMOLOGY | Facility: CLINIC | Age: 69
End: 2024-11-15
Payer: MEDICARE

## 2024-11-20 ENCOUNTER — OFFICE VISIT (OUTPATIENT)
Dept: OPHTHALMOLOGY | Facility: CLINIC | Age: 69
End: 2024-11-20
Payer: MEDICARE

## 2024-11-20 DIAGNOSIS — H26.492 OTHER SECONDARY CATARACT OF LEFT EYE: ICD-10-CM

## 2024-11-20 DIAGNOSIS — Z96.1 ARTIFICIAL LENS PRESENT: ICD-10-CM

## 2024-11-20 DIAGNOSIS — H52.7 REFRACTIVE ERRORS: ICD-10-CM

## 2024-11-20 DIAGNOSIS — E11.9 ABNORMAL METABOLIC STATE DUE TO DIABETES MELLITUS (MULTI): Primary | ICD-10-CM

## 2024-11-20 PROCEDURE — 99214 OFFICE O/P EST MOD 30 MIN: CPT | Performed by: OPHTHALMOLOGY

## 2024-11-20 PROCEDURE — 92325 MODIFICATION OF CONTACT LENS: CPT | Performed by: OPHTHALMOLOGY

## 2024-11-20 PROCEDURE — 92015 DETERMINE REFRACTIVE STATE: CPT | Performed by: OPHTHALMOLOGY

## 2024-11-20 PROCEDURE — 92015 DETERMINE REFRACTIVE STATE: CPT | Mod: MUE | Performed by: OPHTHALMOLOGY

## 2024-11-20 RX ORDER — POTASSIUM CHLORIDE 750 MG/1
TABLET, EXTENDED RELEASE ORAL
COMMUNITY
Start: 2024-09-25

## 2024-11-20 RX ORDER — TIRZEPATIDE 15 MG/.5ML
INJECTION, SOLUTION SUBCUTANEOUS
COMMUNITY
Start: 2024-09-16

## 2024-11-20 ASSESSMENT — CUP TO DISC RATIO
OD_RATIO: 0.4
OS_RATIO: 0.35

## 2024-11-20 ASSESSMENT — VISUAL ACUITY
OD_CC: 20/20
OD_CC: 20/20
METHOD: SNELLEN - LINEAR
METHOD: SNELLEN - LINEAR
OS_CC: J1+
OD_CC+: -1
CORRECTION_TYPE: CONTACTS
OS_CC: 20/20
CORRECTION_TYPE: GLASSES

## 2024-11-20 ASSESSMENT — REFRACTION_MANIFEST
OD_AXIS: 005
OD_SPHERE: -4.00
OD_CYLINDER: -1.00
OD_AXIS: 180
OS_AXIS: 175
OS_ADD: +2.75
OS_SPHERE: -2.50
METHOD_AUTOREFRACTION: 1
OD_ADD: +2.75
OS_CYLINDER: -0.50
OD_CYLINDER: -0.75
OD_SPHERE: -4.50
OS_AXIS: 175
OS_CYLINDER: -0.75
OS_SPHERE: -2.50

## 2024-11-20 ASSESSMENT — MARGIN REFLEX DISTANCE
OD_MRD1: 3.0
OS_MRD1: 3.0

## 2024-11-20 ASSESSMENT — ENCOUNTER SYMPTOMS
PSYCHIATRIC NEGATIVE: 0
GASTROINTESTINAL NEGATIVE: 0
HEMATOLOGIC/LYMPHATIC NEGATIVE: 0
EYES NEGATIVE: 0
MUSCULOSKELETAL NEGATIVE: 0
NEUROLOGICAL NEGATIVE: 0
CONSTITUTIONAL NEGATIVE: 0
CARDIOVASCULAR NEGATIVE: 0
ENDOCRINE NEGATIVE: 0
RESPIRATORY NEGATIVE: 0
ALLERGIC/IMMUNOLOGIC NEGATIVE: 0

## 2024-11-20 ASSESSMENT — TONOMETRY
OS_IOP_MMHG: 16
IOP_METHOD: GOLDMANN APPLANATION
OD_IOP_MMHG: 16

## 2024-11-20 ASSESSMENT — SLIT LAMP EXAM - LIDS
COMMENTS: NORMAL
COMMENTS: NORMAL

## 2024-11-20 ASSESSMENT — REFRACTION_WEARINGRX
OD_AXIS: 001
SPECS_TYPE: DISTANCE
OD_SPHERE: -3.50
OS_CYLINDER: -0.75
OS_SPHERE: -2.25
OS_AXIS: 173
OD_CYLINDER: -0.75

## 2024-11-20 ASSESSMENT — KERATOMETRY
OS_K1POWER_DIOPTERS: 47.00
OD_AXISANGLE_DEGREES: 95
OD_K1POWER_DIOPTERS: 46.75
OS_K2POWER_DIOPTERS: 48.25
OS_AXISANGLE_DEGREES: 80
OD_K2POWER_DIOPTERS: 47.75
OS_AXISANGLE2_DEGREES: 170
OD_AXISANGLE2_DEGREES: 5

## 2024-11-20 ASSESSMENT — PATIENT HEALTH QUESTIONNAIRE - PHQ9
SUM OF ALL RESPONSES TO PHQ9 QUESTIONS 1 AND 2: 0
2. FEELING DOWN, DEPRESSED OR HOPELESS: NOT AT ALL
1. LITTLE INTEREST OR PLEASURE IN DOING THINGS: NOT AT ALL

## 2024-11-20 ASSESSMENT — LEVATOR FUNCTION
OS_LEVATOR: 15
OD_LEVATOR: 15

## 2024-11-20 ASSESSMENT — EXTERNAL EXAM - RIGHT EYE: OD_EXAM: NORMAL

## 2024-11-20 ASSESSMENT — REFRACTION_CURRENTRX
OD_BRAND: ACUVUE 2
OD_DIAMETER: 14.0
OD_SPHERE: -3.50
OD_BASECURVE: 8.3

## 2024-11-20 ASSESSMENT — PAIN SCALES - GENERAL: PAINLEVEL_OUTOF10: 0-NO PAIN

## 2024-11-20 ASSESSMENT — EXTERNAL EXAM - LEFT EYE: OS_EXAM: NORMAL

## 2024-11-20 NOTE — PROGRESS NOTES
Assessment/Plan   Problem List Items Addressed This Visit       Secondary cataract of left eye     Non significant and will monitor with serial exam.          Refractive errors     Updated RX and CL RX given. New Rx for glasses/SCL given per patient request. Patient's signature obtained to acknowledge and confirm that a paper copy of glasses/SCL Rx was given to patient in compliance with [facility] Eyeglass Rule. Electronic copy of Rx will also be available via [employer]/EPIC.            Abnormal metabolic state due to diabetes mellitus (Multi) - Primary     Advised no signs of diabetic changes on exam. Recommend to continue best sugar control and on need for annual checkup. Understands role of good BP control and weight loss if applicable. Discussed some components of selected studies like WESDR, DCCT, and UKPDS to describe the likely course of diabetes and effects on the eyes.           Artificial lens present     Stable intraocular lens (IOL) both eyes (OU), well treated PCO right eye (OD) with YAG, mild PCO left eye (OS). Will monitor with serial exam.             Provided reassurance regarding above diagnoses and care received in the office visit today. Discussed outcomes and options along with the importance of treatment compliance. Understands the importance of any follow up visits. Patient instructed to call/communicate with our office if any new issues, questions, or concerns.     Will plan to see back in 1 year full CL or sooner PRN

## 2024-11-20 NOTE — ASSESSMENT & PLAN NOTE
Stable intraocular lens (IOL) both eyes (OU), well treated PCO right eye (OD) with YAG, mild PCO left eye (OS). Will monitor with serial exam.

## 2024-11-20 NOTE — ASSESSMENT & PLAN NOTE
Updated RX and CL RX given. New Rx for glasses/SCL given per patient request. Patient's signature obtained to acknowledge and confirm that a paper copy of glasses/SCL Rx was given to patient in compliance with [facility] Eyeglass Rule. Electronic copy of Rx will also be available via [employer]/EPIC.

## 2024-11-20 NOTE — PATIENT INSTRUCTIONS
Thank you so much for choosing me to provide your care today!    If you were dilated your vision may remain blurry   or light sensitive for several hours.    The nature of eye and vision problems can require frequent follow up, please make every effort to adhere to any future appointments.    If you have any issues, questions, or concerns,   please do not hesitate to reach out.    If you receive a survey in regards to your care today, please mention any exceptional care my office staff and/or technicians provided.    You can reach our office at this number:    [phone]    Please consider signing up for and utilizing [email or web address]!  This is the best way to directly reach me or other  providers

## 2024-11-20 NOTE — LETTER
November 20, 2024    Alanna Marie DO  [address] [facility] [address]    Patient: Uyen Naranjo   YOB: 1955   Date of Visit: 11/20/2024       Dear Dr. Alanna Marie, :    Thank you for referring Uyen Naranjo to me for evaluation. Here is my assessment and plan of care:    Assessment/Plan:  Uyen was seen today for annual exam.  Diagnoses and all orders for this visit:  Abnormal metabolic state due to diabetes mellitus (Multi) (Primary)  Artificial lens present  Other secondary cataract of left eye  Refractive errors    Right eye:     Left eye:    [x] No diabetes mellitus (DM) retinopathy [x] No diabetes mellitus (DM) retinopathy    [] Mild non-proliferative retinopathy [] Mild non-proliferative retinopathy    [] Moderate non-proliferative retinopathy [] Moderate non-proliferative retinopathy    [] Severe non-proliferative retinopathy [] Severe non-proliferative retinopathy    [] Proliferative retinopathy   [] Proliferative retinopathy    [] Diabetic macular edema   [] Diabetic macular edema    Urged patient to continue to work on best blood sugar and blood pressure control  Advised patient to call if any new vision changes noted    Will plan to repeat evaluation in:   [] 3 months [] 6 months [] 9 months [x] 12 months [] Other    Below you can find relevant pieces of the exam. If you have questions, please do not hesitate to call me. I look forward to following Uyen along with you.         Sincerely,        Roverto Syed MD        CC:   No Recipients         External Exam         Right Left    External Normal Normal    Palpebral fissure 7 mm 7 mm    MRD1 3.0 mm 3.0 mm    Levator 15 mm 15 mm              Slit Lamp Exam         Right Left    Lids/Lashes Normal Normal    Conjunctiva/Sclera White and quiet White and quiet    Cornea Clear Clear    Anterior Chamber Deep and quiet Deep and quiet    Iris Round and reactive Round and reactive    Lens S/P YAG  "capsulotomy, Posterior chamber intraocular lens cloudy posterior capsule, Posterior chamber intraocular lens              Fundus Exam         Right Left    Vitreous Normal Normal    Disc Tilted disc, Peripapillary atrophy Tilted disc    C/D Ratio 0.4 0.35    Macula Mottling Normal    Vessels Normal Normal    Periphery Normal Normal                   <div id=\"MAIN_EXAM_REVIEWED\"></div>     "

## 2024-11-21 ENCOUNTER — APPOINTMENT (OUTPATIENT)
Dept: CARDIOLOGY | Facility: CLINIC | Age: 69
End: 2024-11-21
Payer: MEDICARE

## 2024-11-22 ENCOUNTER — OFFICE VISIT (OUTPATIENT)
Dept: OTOLARYNGOLOGY | Facility: CLINIC | Age: 69
End: 2024-11-22
Payer: MEDICARE

## 2024-11-22 ENCOUNTER — OFFICE VISIT (OUTPATIENT)
Dept: CARDIOLOGY | Facility: CLINIC | Age: 69
End: 2024-11-22
Payer: MEDICARE

## 2024-11-22 VITALS
BODY MASS INDEX: 41.99 KG/M2 | WEIGHT: 215 LBS | DIASTOLIC BLOOD PRESSURE: 70 MMHG | SYSTOLIC BLOOD PRESSURE: 118 MMHG | OXYGEN SATURATION: 97 % | HEART RATE: 68 BPM

## 2024-11-22 VITALS — WEIGHT: 215 LBS | HEIGHT: 60 IN | BODY MASS INDEX: 42.21 KG/M2

## 2024-11-22 DIAGNOSIS — R43.8 DECREASED SENSE OF SMELL: ICD-10-CM

## 2024-11-22 DIAGNOSIS — G47.33 OBSTRUCTIVE SLEEP APNEA: Primary | ICD-10-CM

## 2024-11-22 DIAGNOSIS — E78.2 MIXED HYPERLIPIDEMIA: ICD-10-CM

## 2024-11-22 DIAGNOSIS — I10 BENIGN ESSENTIAL HYPERTENSION: Primary | ICD-10-CM

## 2024-11-22 DIAGNOSIS — J31.0 CHRONIC RHINITIS: ICD-10-CM

## 2024-11-22 DIAGNOSIS — R93.1 ELEVATED CORONARY ARTERY CALCIUM SCORE: ICD-10-CM

## 2024-11-22 DIAGNOSIS — I63.9 CRYPTOGENIC STROKE (MULTI): ICD-10-CM

## 2024-11-22 DIAGNOSIS — I47.10 SVT (SUPRAVENTRICULAR TACHYCARDIA) (CMS-HCC): ICD-10-CM

## 2024-11-22 PROCEDURE — 99214 OFFICE O/P EST MOD 30 MIN: CPT | Performed by: OTOLARYNGOLOGY

## 2024-11-22 PROCEDURE — 1157F ADVNC CARE PLAN IN RCRD: CPT | Performed by: OTOLARYNGOLOGY

## 2024-11-22 PROCEDURE — 99214 OFFICE O/P EST MOD 30 MIN: CPT | Performed by: INTERNAL MEDICINE

## 2024-11-22 PROCEDURE — 1159F MED LIST DOCD IN RCRD: CPT | Performed by: INTERNAL MEDICINE

## 2024-11-22 PROCEDURE — 3048F LDL-C <100 MG/DL: CPT | Performed by: INTERNAL MEDICINE

## 2024-11-22 PROCEDURE — 1036F TOBACCO NON-USER: CPT | Performed by: INTERNAL MEDICINE

## 2024-11-22 PROCEDURE — 1159F MED LIST DOCD IN RCRD: CPT | Performed by: OTOLARYNGOLOGY

## 2024-11-22 PROCEDURE — 1157F ADVNC CARE PLAN IN RCRD: CPT | Performed by: INTERNAL MEDICINE

## 2024-11-22 PROCEDURE — 3008F BODY MASS INDEX DOCD: CPT | Performed by: OTOLARYNGOLOGY

## 2024-11-22 PROCEDURE — 3078F DIAST BP <80 MM HG: CPT | Performed by: INTERNAL MEDICINE

## 2024-11-22 PROCEDURE — 3048F LDL-C <100 MG/DL: CPT | Performed by: OTOLARYNGOLOGY

## 2024-11-22 PROCEDURE — 3074F SYST BP LT 130 MM HG: CPT | Performed by: INTERNAL MEDICINE

## 2024-11-22 PROCEDURE — 1126F AMNT PAIN NOTED NONE PRSNT: CPT | Performed by: INTERNAL MEDICINE

## 2024-11-22 PROCEDURE — 1036F TOBACCO NON-USER: CPT | Performed by: OTOLARYNGOLOGY

## 2024-11-22 RX ORDER — ROSUVASTATIN CALCIUM 40 MG/1
40 TABLET, COATED ORAL DAILY
Qty: 90 TABLET | Refills: 3 | Status: SHIPPED | OUTPATIENT
Start: 2024-11-22 | End: 2024-11-22 | Stop reason: SDUPTHER

## 2024-11-22 RX ORDER — AMLODIPINE BESYLATE 5 MG/1
5 TABLET ORAL DAILY
Qty: 90 TABLET | Refills: 3 | Status: SHIPPED | OUTPATIENT
Start: 2024-11-22

## 2024-11-22 RX ORDER — ROSUVASTATIN CALCIUM 40 MG/1
40 TABLET, COATED ORAL DAILY
Qty: 90 TABLET | Refills: 3 | Status: SHIPPED | OUTPATIENT
Start: 2024-11-22 | End: 2025-11-22

## 2024-11-22 RX ORDER — IPRATROPIUM BROMIDE 42 UG/1
SPRAY, METERED NASAL
Qty: 45 ML | Refills: 3 | Status: SHIPPED | OUTPATIENT
Start: 2024-11-22

## 2024-11-22 ASSESSMENT — ENCOUNTER SYMPTOMS
ABDOMINAL PAIN: 0
BLURRED VISION: 0
DYSURIA: 0
COUGH: 0
PALPITATIONS: 0
DYSPNEA ON EXERTION: 0
PARESTHESIAS: 0
SHORTNESS OF BREATH: 0
DEPRESSION: 0
LOSS OF SENSATION IN FEET: 0
OCCASIONAL FEELINGS OF UNSTEADINESS: 0
HEMATURIA: 0
NUMBNESS: 0

## 2024-11-22 ASSESSMENT — PATIENT HEALTH QUESTIONNAIRE - PHQ9
1. LITTLE INTEREST OR PLEASURE IN DOING THINGS: NOT AT ALL
2. FEELING DOWN, DEPRESSED OR HOPELESS: NOT AT ALL
SUM OF ALL RESPONSES TO PHQ9 QUESTIONS 1 AND 2: 0

## 2024-11-22 ASSESSMENT — PAIN SCALES - GENERAL: PAINLEVEL_OUTOF10: 0-NO PAIN

## 2024-11-22 NOTE — PROGRESS NOTES
Chief Complaint   Patient presents with    Follow-up     EP 23- CPAP FOLLOW UP      HPI:  Uyen Naranjo is a 69 y.o. female who presents today for evaluation of her CPAP compliance, which has been great, as well as some problems with some chronic rhinitis and clear nasal drainage.  Especially with cold and exercise.  History of some diminished smell after using Zicam.  History of obstructive sleep apnea which was diagnosed in the distant past.   Retested .   Continues to have sleep apnea with an AHI of 12.4.   She was titrated and the best pressure was from 12 to 18 cm of water.   She uses this 100% of nights.   She loves it.   She has great clinical compliance and resolution of symptoms.     PMH:  Past Medical History:   Diagnosis Date    Dermatochalasis of both upper eyelids     Diabetes mellitus without complication (Multi)     Disorder of lipoprotein metabolism, unspecified     Elevated serum cholesterol    History of YAG laser capsulotomy of lens of right eye         Personal history of other diseases of the circulatory system     History of hypertension    Personal history of other diseases of the nervous system and sense organs     History of sleep apnea    Personal history of other endocrine, nutritional and metabolic disease     History of diabetes mellitus    Posterior capsular opacification, left eye     Unspecified disorder of refraction      Past Surgical History:   Procedure Laterality Date    CATARACT EXTRACTION W/  INTRAOCULAR LENS IMPLANT Bilateral     OD 2007 +14.5D, OS 2020 +16.5D    MR HEAD ANGIO WO IV CONTRAST  2020    MR HEAD ANGIO WO IV CONTRAST [facility]    OTHER SURGICAL HISTORY  2021     section    OTHER SURGICAL HISTORY  2022    Foot surgery    OTHER SURGICAL HISTORY  2022    Tubal ligation    OTHER SURGICAL HISTORY  2022    Eye surgery    OTHER SURGICAL HISTORY  2022    Leg surgery    OTHER SURGICAL  HISTORY  08/22/2022    Hip surgery         Medications:     Current Outpatient Medications:     albuterol (ProAir HFA) 90 mcg/actuation inhaler, Inhale 2 puffs 3 times a day as needed., Disp: , Rfl:     amLODIPine (Norvasc) 5 mg tablet, Take 1 tablet (5 mg) by mouth once daily., Disp: 90 tablet, Rfl: 3    blood sugar diagnostic strip, 1-2 strips., Disp: , Rfl:     budesonide-formoteroL (Symbicort) 160-4.5 mcg/actuation inhaler, Inhale 2 puffs 2 times a day., Disp: , Rfl:     cholecalciferol (Vitamin D-3) 5,000 Units tablet, Take by mouth. As directed, Disp: , Rfl:     clopidogrel (Plavix) 75 mg tablet, Take 1 tablet (75 mg) by mouth once daily., Disp: , Rfl:     cyanocobalamin, vitamin B-12, (VITAMIN B-12 ORAL), Place under the tongue. 3x/wk, Disp: , Rfl:     escitalopram (Lexapro) 10 mg tablet, Take 1 tablet (10 mg) by mouth once daily., Disp: , Rfl:     fluticasone propion-salmeteroL (Advair Diskus) 250-50 mcg/dose diskus inhaler, Inhale 1 puff 2 times a day., Disp: , Rfl:     levothyroxine (Synthroid) 75 mcg tablet, Take 1 tablet (75 mcg) by mouth once daily., Disp: , Rfl:     magnesium citrate and oxide (magnesium citrate,mag oxide) 250 mg capsule, Take 2 capsules by mouth once daily. WITH MEAL, Disp: , Rfl:     metFORMIN (Glucophage) 500 mg tablet, Take 2 tablets (1,000 mg) by mouth once daily., Disp: , Rfl:     Mounjaro 15 mg/0.5 mL pen injector, , Disp: , Rfl:     potassium chloride CR 10 mEq ER tablet, , Disp: , Rfl:     rosuvastatin (Crestor) 40 mg tablet, Take 1 tablet (40 mg) by mouth once daily., Disp: 90 tablet, Rfl: 3    triamterene-hydrochlorothiazid (Maxzide) 75-50 mg tablet, Take 1 tablet by mouth once daily., Disp: 90 tablet, Rfl: 3    ipratropium (Atrovent) 42 mcg (0.06 %) nasal spray, 2 sprays in each nostril every 6 hours as needed for runny nose, 3 bottle, 3 refills, Disp: 45 mL, Rfl: 3    TIRZEPATIDE SUBQ, Inject under the skin every 7 days., Disp: , Rfl:      Allergies:  Allergies   Allergen  Reactions    Dilaudid [Hydromorphone] Anaphylaxis    Other Shortness of breath     Rabbit hair    Rabbit Shortness of breath    Animal Dander Other    Morphine Itching    Penicillin Rash    Sulfa (Sulfonamide Antibiotics) Other and GI Upset     Nausea , GI upset    Tetracycline Hcl Rash        ROS:  Review of systems normal unless stated otherwise in the HPI and/or PMH.    Physical Exam:  Height 1.524 m (5'), weight 97.5 kg (215 lb). Body mass index is 41.99 kg/m².     GENERAL APPEARANCE: Well developed and well nourished.  Alert and oriented in no acute distress.  Normal vocal quality.      HEAD/FACE: No erythema or edema or facial tenderness.  Normal facial nerve function bilaterally.    EAR:       EXTERNAL: Normal pinnas and external auditory canals without lesion or obstructing wax.       MIDDLE EAR: Tympanic membranes intact and mobile with normal landmarks.  Middle ear space appears well aerated.       TUBE STATUS: N/A       MASTOID CAVITY: N/A       HEARING: Gross hearing assessment is within normal limits.      NOSE:       VISUALIZED USING: Anterior rhinoscopy with headlight and nasal speculum.       DORSUM: Midline, nontraumatic appearance.       MUCOSA: Normal-appearing.       SECRETIONS: Normal.       SEPTUM: Midline and nonobstructing.       INFERIOR TURBINATES: Normal.       MIDDLE TURBINATES/MEATUS: N/A       BLEEDING: N/A         ORAL CAVITY/PHARYNX:       TEETH: Adequate dentition.       TONGUE: No mass or lesion.  Normal mobility.       FLOOR OF MOUTH: No mass or lesion.       PALATE: Normal hard palate, soft palate, and uvula.       OROPHARYNX: Normal without mass or lesion.       BUCCAL MUCOSA/GBS: Normal without mass or lesion.       LIPS: Normal.    LARYNX/HYPOPHARYNX/NASOPHARYNX: N/A    NECK: No palpable masses or abnormal adenopathy.  Trachea is midline.    THYROID: No thyromegaly or palpable nodule.    SALIVARY GLANDS: Normal bilateral parotid and submandibular glands by inspection and  palpation.    TMJ's: Normal.    NEURO: Cranial nerve exam grossly normal bilaterally.       Assessment/Plan   Uyen was seen today for follow-up.  Diagnoses and all orders for this visit:  Obstructive sleep apnea (Primary)  Chronic rhinitis  -     ipratropium (Atrovent) 42 mcg (0.06 %) nasal spray; 2 sprays in each nostril every 6 hours as needed for runny nose, 3 bottle, 3 refills  Decreased sense of smell     Doing great with her CPAP compliance and clinical effect.  She will continue.  Will try Atrovent for her chronic rhinitis and runny nose.  Follow up in about 1 year (around 11/22/2025).     Luis Arthur MD

## 2024-11-22 NOTE — ASSESSMENT & PLAN NOTE
Blood pressure very well-controlled on the amlodipine and triamterene combination.  No changes necessary at this time.

## 2024-11-22 NOTE — PROGRESS NOTES
Subjective   Uyen Naranjo is a 69 y.o. female.    Chief Complaint:  Follow-up    HPI  Overall feels very well.  No palpitations or chest discomfort.  Remains physically active without any significant restrictions.    Review of Systems   Constitutional: Negative for malaise/fatigue.   HENT:  Negative for congestion.    Eyes:  Negative for blurred vision.   Cardiovascular:  Negative for chest pain, dyspnea on exertion and palpitations.   Respiratory:  Negative for cough and shortness of breath.    Musculoskeletal:  Negative for joint pain.   Gastrointestinal:  Negative for abdominal pain.   Genitourinary:  Negative for dysuria and hematuria.   Neurological:  Negative for numbness and paresthesias.       Objective   Constitutional:       Appearance: Not in distress.   Eyes:      Conjunctiva/sclera: Conjunctivae normal.   Neck:      Vascular: JVD normal.   Pulmonary:      Breath sounds: Normal breath sounds. No wheezing. No rhonchi. No rales.   Cardiovascular:      Normal rate. Regular rhythm.      Murmurs: There is no murmur.      No gallop.  No click. No rub.   Abdominal:      Palpations: Abdomen is soft.   Neurological:      General: No focal deficit present.      Mental Status: Alert.         Lab Review:       Assessment/Plan   The primary encounter diagnosis was Benign essential hypertension. Diagnoses of Elevated coronary artery calcium score, Mixed hyperlipidemia, SVT (supraventricular tachycardia) (CMS-HCC), and Cryptogenic stroke (Multi) were also pertinent to this visit.    Cryptogenic stroke (Multi)  Continue clopidogrel and follow clinically.    Hyperlipidemia  Last LDL 55, very good.  Will recheck fasting lipid panel on return.    Benign essential hypertension  Blood pressure very well-controlled on the amlodipine and triamterene combination.  No changes necessary at this time.    Elevated coronary artery calcium score  Asymptomatic from a cardiac standpoint.  Will continue to pursue standard risk  factor modification and follow on a clinical basis.    SVT (supraventricular tachycardia) (CMS-HCC)  No symptomatic recurrences, we will continue just to follow on a clinical basis at this time.

## 2024-11-22 NOTE — ASSESSMENT & PLAN NOTE
Asymptomatic from a cardiac standpoint.  Will continue to pursue standard risk factor modification and follow on a clinical basis.

## 2024-11-29 ENCOUNTER — HOSPITAL ENCOUNTER (OUTPATIENT)
Dept: RADIOLOGY | Facility: HOSPITAL | Age: 69
Discharge: HOME | End: 2024-11-29
Payer: MEDICARE

## 2024-11-29 DIAGNOSIS — S83.241A OTHER TEAR OF MEDIAL MENISCUS, CURRENT INJURY, RIGHT KNEE, INITIAL ENCOUNTER: ICD-10-CM

## 2024-11-29 PROCEDURE — 73721 MRI JNT OF LWR EXTRE W/O DYE: CPT | Mod: RIGHT SIDE | Performed by: RADIOLOGY

## 2024-11-29 PROCEDURE — 73721 MRI JNT OF LWR EXTRE W/O DYE: CPT | Mod: RT

## 2024-12-03 ENCOUNTER — APPOINTMENT (OUTPATIENT)
Dept: OTOLARYNGOLOGY | Facility: CLINIC | Age: 69
End: 2024-12-03
Payer: MEDICARE

## 2025-01-02 ENCOUNTER — PATIENT MESSAGE (OUTPATIENT)
Dept: OTOLARYNGOLOGY | Facility: CLINIC | Age: 70
End: 2025-01-02
Payer: MEDICARE

## 2025-01-02 DIAGNOSIS — I63.9 CRYPTOGENIC STROKE (MULTI): Primary | ICD-10-CM

## 2025-01-02 DIAGNOSIS — E78.2 MIXED HYPERLIPIDEMIA: ICD-10-CM

## 2025-01-02 DIAGNOSIS — J31.0 CHRONIC RHINITIS: ICD-10-CM

## 2025-01-02 DIAGNOSIS — I10 BENIGN ESSENTIAL HYPERTENSION: ICD-10-CM

## 2025-01-03 RX ORDER — IPRATROPIUM BROMIDE 42 UG/1
SPRAY, METERED NASAL
Qty: 45 ML | Refills: 3 | Status: SHIPPED | OUTPATIENT
Start: 2025-01-03

## 2025-01-06 RX ORDER — CLOPIDOGREL BISULFATE 75 MG/1
75 TABLET ORAL DAILY
Qty: 90 TABLET | Refills: 3 | Status: SHIPPED | OUTPATIENT
Start: 2025-01-06

## 2025-01-06 RX ORDER — ROSUVASTATIN CALCIUM 40 MG/1
40 TABLET, COATED ORAL DAILY
Qty: 90 TABLET | Refills: 3 | Status: SHIPPED | OUTPATIENT
Start: 2025-01-06 | End: 2026-01-06

## 2025-01-06 RX ORDER — AMLODIPINE BESYLATE 5 MG/1
5 TABLET ORAL DAILY
Qty: 90 TABLET | Refills: 3 | Status: SHIPPED | OUTPATIENT
Start: 2025-01-06

## 2025-01-08 ENCOUNTER — HOSPITAL ENCOUNTER (EMERGENCY)
Facility: HOSPITAL | Age: 70
Discharge: HOME | End: 2025-01-09
Attending: STUDENT IN AN ORGANIZED HEALTH CARE EDUCATION/TRAINING PROGRAM
Payer: MEDICARE

## 2025-01-08 ENCOUNTER — APPOINTMENT (OUTPATIENT)
Dept: RADIOLOGY | Facility: HOSPITAL | Age: 70
End: 2025-01-08
Payer: MEDICARE

## 2025-01-08 ENCOUNTER — APPOINTMENT (OUTPATIENT)
Dept: CARDIOLOGY | Facility: HOSPITAL | Age: 70
End: 2025-01-08
Payer: MEDICARE

## 2025-01-08 ENCOUNTER — HOSPITAL ENCOUNTER (EMERGENCY)
Facility: HOSPITAL | Age: 70
Discharge: OTHER INSTITUTION | End: 2025-01-08
Attending: EMERGENCY MEDICINE
Payer: MEDICARE

## 2025-01-08 VITALS
BODY MASS INDEX: 43.46 KG/M2 | DIASTOLIC BLOOD PRESSURE: 86 MMHG | RESPIRATION RATE: 14 BRPM | HEART RATE: 102 BPM | HEIGHT: 60 IN | OXYGEN SATURATION: 100 % | SYSTOLIC BLOOD PRESSURE: 133 MMHG | WEIGHT: 221.34 LBS | TEMPERATURE: 97.9 F

## 2025-01-08 DIAGNOSIS — H53.9 CHANGES IN VISION: Primary | ICD-10-CM

## 2025-01-08 DIAGNOSIS — H53.9 VISION CHANGES: Primary | ICD-10-CM

## 2025-01-08 LAB
ALBUMIN SERPL BCP-MCNC: 4.2 G/DL (ref 3.4–5)
ALP SERPL-CCNC: 73 U/L (ref 33–136)
ALT SERPL W P-5'-P-CCNC: 19 U/L (ref 7–45)
ANION GAP SERPL CALCULATED.3IONS-SCNC: 14 MMOL/L (ref 10–20)
APPEARANCE UR: CLEAR
AST SERPL W P-5'-P-CCNC: 16 U/L (ref 9–39)
BASOPHILS # BLD AUTO: 0.05 X10*3/UL (ref 0–0.1)
BASOPHILS NFR BLD AUTO: 0.5 %
BILIRUB SERPL-MCNC: 0.3 MG/DL (ref 0–1.2)
BILIRUB UR STRIP.AUTO-MCNC: NEGATIVE MG/DL
BNP SERPL-MCNC: 18 PG/ML (ref 0–99)
BUN SERPL-MCNC: 21 MG/DL (ref 6–23)
CALCIUM SERPL-MCNC: 9.5 MG/DL (ref 8.6–10.3)
CARDIAC TROPONIN I PNL SERPL HS: <3 NG/L (ref 0–13)
CARDIAC TROPONIN I PNL SERPL HS: <3 NG/L (ref 0–13)
CHLORIDE SERPL-SCNC: 105 MMOL/L (ref 98–107)
CO2 SERPL-SCNC: 24 MMOL/L (ref 21–32)
COLOR UR: ABNORMAL
CREAT SERPL-MCNC: 0.85 MG/DL (ref 0.5–1.05)
EGFRCR SERPLBLD CKD-EPI 2021: 73 ML/MIN/1.73M*2
EOSINOPHIL # BLD AUTO: 0.08 X10*3/UL (ref 0–0.4)
EOSINOPHIL NFR BLD AUTO: 0.8 %
ERYTHROCYTE [DISTWIDTH] IN BLOOD BY AUTOMATED COUNT: 14.4 % (ref 11.5–14.5)
GLUCOSE BLD MANUAL STRIP-MCNC: 134 MG/DL (ref 74–99)
GLUCOSE SERPL-MCNC: 126 MG/DL (ref 74–99)
GLUCOSE UR STRIP.AUTO-MCNC: NORMAL MG/DL
HCT VFR BLD AUTO: 43.2 % (ref 36–46)
HGB BLD-MCNC: 14.6 G/DL (ref 12–16)
IMM GRANULOCYTES # BLD AUTO: 0.03 X10*3/UL (ref 0–0.5)
IMM GRANULOCYTES NFR BLD AUTO: 0.3 % (ref 0–0.9)
INR PPP: 1.1 (ref 0.9–1.2)
KETONES UR STRIP.AUTO-MCNC: NEGATIVE MG/DL
LACTATE SERPL-SCNC: 1.6 MMOL/L (ref 0.4–2)
LEUKOCYTE ESTERASE UR QL STRIP.AUTO: NEGATIVE
LYMPHOCYTES # BLD AUTO: 3.04 X10*3/UL (ref 0.8–3)
LYMPHOCYTES NFR BLD AUTO: 31.9 %
MCH RBC QN AUTO: 27.3 PG (ref 26–34)
MCHC RBC AUTO-ENTMCNC: 33.8 G/DL (ref 32–36)
MCV RBC AUTO: 81 FL (ref 80–100)
MONOCYTES # BLD AUTO: 0.72 X10*3/UL (ref 0.05–0.8)
MONOCYTES NFR BLD AUTO: 7.6 %
NEUTROPHILS # BLD AUTO: 5.61 X10*3/UL (ref 1.6–5.5)
NEUTROPHILS NFR BLD AUTO: 58.9 %
NITRITE UR QL STRIP.AUTO: NEGATIVE
NRBC BLD-RTO: 0 /100 WBCS (ref 0–0)
PH UR STRIP.AUTO: 7 [PH]
PLATELET # BLD AUTO: 231 X10*3/UL (ref 150–450)
POTASSIUM SERPL-SCNC: 3.5 MMOL/L (ref 3.5–5.3)
PROT SERPL-MCNC: 7.1 G/DL (ref 6.4–8.2)
PROT UR STRIP.AUTO-MCNC: ABNORMAL MG/DL
PROTHROMBIN TIME: 10.9 SECONDS (ref 9.3–12.7)
RBC # BLD AUTO: 5.34 X10*6/UL (ref 4–5.2)
RBC # UR STRIP.AUTO: NEGATIVE /UL
RBC #/AREA URNS AUTO: NORMAL /HPF
SODIUM SERPL-SCNC: 139 MMOL/L (ref 136–145)
SP GR UR STRIP.AUTO: >1.05
UROBILINOGEN UR STRIP.AUTO-MCNC: NORMAL MG/DL
WBC # BLD AUTO: 9.5 X10*3/UL (ref 4.4–11.3)
WBC #/AREA URNS AUTO: NORMAL /HPF

## 2025-01-08 PROCEDURE — 70496 CT ANGIOGRAPHY HEAD: CPT | Performed by: STUDENT IN AN ORGANIZED HEALTH CARE EDUCATION/TRAINING PROGRAM

## 2025-01-08 PROCEDURE — 72125 CT NECK SPINE W/O DYE: CPT | Mod: RCN

## 2025-01-08 PROCEDURE — 70450 CT HEAD/BRAIN W/O DYE: CPT

## 2025-01-08 PROCEDURE — 71045 X-RAY EXAM CHEST 1 VIEW: CPT | Performed by: STUDENT IN AN ORGANIZED HEALTH CARE EDUCATION/TRAINING PROGRAM

## 2025-01-08 PROCEDURE — 36415 COLL VENOUS BLD VENIPUNCTURE: CPT | Performed by: EMERGENCY MEDICINE

## 2025-01-08 PROCEDURE — 70498 CT ANGIOGRAPHY NECK: CPT

## 2025-01-08 PROCEDURE — 83605 ASSAY OF LACTIC ACID: CPT | Performed by: EMERGENCY MEDICINE

## 2025-01-08 PROCEDURE — 72125 CT NECK SPINE W/O DYE: CPT | Performed by: STUDENT IN AN ORGANIZED HEALTH CARE EDUCATION/TRAINING PROGRAM

## 2025-01-08 PROCEDURE — 2550000001 HC RX 255 CONTRASTS: Performed by: EMERGENCY MEDICINE

## 2025-01-08 PROCEDURE — 71045 X-RAY EXAM CHEST 1 VIEW: CPT

## 2025-01-08 PROCEDURE — 99291 CRITICAL CARE FIRST HOUR: CPT | Mod: 25 | Performed by: EMERGENCY MEDICINE

## 2025-01-08 PROCEDURE — 93005 ELECTROCARDIOGRAM TRACING: CPT

## 2025-01-08 PROCEDURE — 81001 URINALYSIS AUTO W/SCOPE: CPT | Performed by: EMERGENCY MEDICINE

## 2025-01-08 PROCEDURE — 80053 COMPREHEN METABOLIC PANEL: CPT | Performed by: EMERGENCY MEDICINE

## 2025-01-08 PROCEDURE — 99284 EMERGENCY DEPT VISIT MOD MDM: CPT | Performed by: STUDENT IN AN ORGANIZED HEALTH CARE EDUCATION/TRAINING PROGRAM

## 2025-01-08 PROCEDURE — 85025 COMPLETE CBC W/AUTO DIFF WBC: CPT | Performed by: EMERGENCY MEDICINE

## 2025-01-08 PROCEDURE — 85610 PROTHROMBIN TIME: CPT | Performed by: EMERGENCY MEDICINE

## 2025-01-08 PROCEDURE — 99281 EMR DPT VST MAYX REQ PHY/QHP: CPT | Performed by: STUDENT IN AN ORGANIZED HEALTH CARE EDUCATION/TRAINING PROGRAM

## 2025-01-08 PROCEDURE — 70498 CT ANGIOGRAPHY NECK: CPT | Performed by: STUDENT IN AN ORGANIZED HEALTH CARE EDUCATION/TRAINING PROGRAM

## 2025-01-08 PROCEDURE — 84484 ASSAY OF TROPONIN QUANT: CPT | Performed by: EMERGENCY MEDICINE

## 2025-01-08 PROCEDURE — 82947 ASSAY GLUCOSE BLOOD QUANT: CPT

## 2025-01-08 PROCEDURE — 70450 CT HEAD/BRAIN W/O DYE: CPT | Performed by: STUDENT IN AN ORGANIZED HEALTH CARE EDUCATION/TRAINING PROGRAM

## 2025-01-08 PROCEDURE — 83880 ASSAY OF NATRIURETIC PEPTIDE: CPT | Performed by: EMERGENCY MEDICINE

## 2025-01-08 RX ADMIN — IOHEXOL 75 ML: 350 INJECTION, SOLUTION INTRAVENOUS at 20:18

## 2025-01-08 ASSESSMENT — PAIN SCALES - GENERAL
PAINLEVEL_OUTOF10: 0 - NO PAIN
PAINLEVEL_OUTOF10: 0 - NO PAIN

## 2025-01-08 ASSESSMENT — COLUMBIA-SUICIDE SEVERITY RATING SCALE - C-SSRS
1. IN THE PAST MONTH, HAVE YOU WISHED YOU WERE DEAD OR WISHED YOU COULD GO TO SLEEP AND NOT WAKE UP?: NO
2. HAVE YOU ACTUALLY HAD ANY THOUGHTS OF KILLING YOURSELF?: NO
2. HAVE YOU ACTUALLY HAD ANY THOUGHTS OF KILLING YOURSELF?: NO
6. HAVE YOU EVER DONE ANYTHING, STARTED TO DO ANYTHING, OR PREPARED TO DO ANYTHING TO END YOUR LIFE?: NO
6. HAVE YOU EVER DONE ANYTHING, STARTED TO DO ANYTHING, OR PREPARED TO DO ANYTHING TO END YOUR LIFE?: NO
1. IN THE PAST MONTH, HAVE YOU WISHED YOU WERE DEAD OR WISHED YOU COULD GO TO SLEEP AND NOT WAKE UP?: NO

## 2025-01-08 ASSESSMENT — PAIN - FUNCTIONAL ASSESSMENT
PAIN_FUNCTIONAL_ASSESSMENT: 0-10
PAIN_FUNCTIONAL_ASSESSMENT: 0-10

## 2025-01-08 ASSESSMENT — LIFESTYLE VARIABLES
EVER HAD A DRINK FIRST THING IN THE MORNING TO STEADY YOUR NERVES TO GET RID OF A HANGOVER: NO
HAVE YOU EVER FELT YOU SHOULD CUT DOWN ON YOUR DRINKING: NO
EVER FELT BAD OR GUILTY ABOUT YOUR DRINKING: NO
TOTAL SCORE: 0
HAVE PEOPLE ANNOYED YOU BY CRITICIZING YOUR DRINKING: NO

## 2025-01-09 VITALS
DIASTOLIC BLOOD PRESSURE: 86 MMHG | WEIGHT: 220 LBS | HEART RATE: 83 BPM | TEMPERATURE: 98.7 F | SYSTOLIC BLOOD PRESSURE: 151 MMHG | BODY MASS INDEX: 35.36 KG/M2 | HEIGHT: 66 IN | OXYGEN SATURATION: 98 % | RESPIRATION RATE: 18 BRPM

## 2025-01-09 LAB
ATRIAL RATE: 112 BPM
P AXIS: 22 DEGREES
P OFFSET: 159 MS
P ONSET: 123 MS
PR INTERVAL: 214 MS
Q ONSET: 230 MS
QRS COUNT: 19 BEATS
QRS DURATION: 72 MS
QT INTERVAL: 310 MS
QTC CALCULATION(BAZETT): 423 MS
QTC FREDERICIA: 381 MS
R AXIS: -27 DEGREES
T AXIS: 16 DEGREES
T OFFSET: 385 MS
VENTRICULAR RATE: 112 BPM

## 2025-01-09 NOTE — ED TRIAGE NOTES
Pt is a transfer from [facility] ED. Pt report that she fell 4 days ago with a head strike on an open drawer, denies LOC, does take plavix for old CVA. Today just after dinner pt started having vision changed in her left eye. Pt reports that the vision changes lasted for roughly 20 minutes and then corrected itself. OSH did scans to rule out any new stroke. Pt being transferred to [facility] for optho consult to rule out retinal detachment. Upon arrival to [facility] pt is A+OX4 with no complaints, pt denies any and or vision disturbances at this time.

## 2025-01-09 NOTE — ED PROVIDER NOTES
"Emergency Department Provider Note        History of Present Illness     CC: optho consult     HPI:  This is a 69-year-old female who presents to the emergency department as a transfer from outside hospital for ophthalmology evaluation.  States that she had sudden onset this evening while watching TV of what she describes as \"wavy\" vision in her left eye.  States that she had never had anything like this before however did have remote history of an occipital stroke in which she lost part of her vision however regained this back.  She attempted to put on her glasses when the vision changes occurred however this only made things worse and so she removed them and decided to get checked out in the emergency department given her history.  At the outside hospital initially stroke was considered and worked up however ultimately neurology recommended ophthalmology evaluation and she presents now for dilated eye exam.  Her symptoms have completely resolved and right now she is asymptomatic.  Denies any headaches associated with this.  Denies any sinus congestion, runny nose, upper respiratory symptoms.  Denies any fevers or chills.  Denies any nausea or vomiting.  No other symptoms at this time.    Limitations to history: None  Independent historian(s): None   Records Reviewed: Recent available ED and inpatient notes reviewed in EMR.    PMHx/PSHx:  Per HPI.   - has a past medical history of Dermatochalasis of both upper eyelids, Diabetes mellitus without complication (Multi), Disorder of lipoprotein metabolism, unspecified, History of YAG laser capsulotomy of lens of right eye, Personal history of other diseases of the circulatory system, Personal history of other diseases of the nervous system and sense organs, Personal history of other endocrine, nutritional and metabolic disease, Posterior capsular opacification, left eye, and Unspecified disorder of refraction.  - has a past surgical history that includes Other surgical " history (03/23/2021); Other surgical history (08/22/2022); Other surgical history (08/22/2022); Other surgical history (08/22/2022); Other surgical history (08/22/2022); Other surgical history (08/22/2022); MR angio head wo IV contrast (01/26/2020); and Cataract extraction w/  intraocular lens implant (Bilateral).    Medications:  Reviewed in EMR. See EMR for complete list of medications and doses.    Allergies:  Dilaudid [hydromorphone], Other, Rabbit, Animal dander, Morphine, Penicillin, Sulfa (sulfonamide antibiotics), and Tetracycline hcl    Social History:  - Tobacco:  reports that she quit smoking about 15 years ago. Her smoking use included cigarettes. She has never used smokeless tobacco.   - Alcohol:  reports current alcohol use.   - Illicit Drugs:  reports current drug use. Frequency: 2.00 times per week. Drug: Marijuana.     ROS:  Per HPI.       Physical Exam     Triage Vitals:  T 37.1 °C (98.7 °F)  HR 95  /90  RR 16  O2 98 %      General: Patient resting comfortably, no acute distress, overall well appearing, and appropriately conversational.   Head: Normocephalic. Atraumatic.  Neck: FROM. No gross masses.   Eyes: EOMI. Conjunctiva clear.  Pupils 3 mm equal and reactive bilaterally.  Visual fields intact.  ENT: Moist mucous membranes, no apparent trauma or lesions.  CV: Regular rate. 2+ radial pulses bilaterally.  Resp: No respiratory distress, breathing easily. Speaks in full sentences.    GI: Soft, non-distended. No tenderness with palpation.  EXT: No peripheral edema, contusions, or wounds.   Skin: Warm and dry, no rashes or lesions.  Neuro: Alert and oriented. No focal neurological deficits. Motor and sensation intact throughout. Speech fluent. Normal gait.   Psych: Appropriate mood and behavior, converses and responds appropriately.      Medical Decision Making & ED Course     Labs:   Labs Reviewed - No data to display     Imaging:   No orders to display        EKG:  None    MDM:  This is  "a 69-year-old female who presents to the emergency department as a transfer from outside hospital for ophthalmology consult.  She is hemodynamically stable and in no distress.  Vital signs are within normal limits.  Her physical exam is completely unremarkable and normal.  Ophthalmology consulted.  Given her description of symptoms I have low suspicion at this time for stroke.  Description sounds similar to amaurosis fugax.  Have lower suspicion for retinal detachment or vitreous detachment.      Ophthalmology evaluated the patient at bedside.  Dilated exam is normal reveals no abnormalities.  Their recommendation is to pursue TIA workup.  Had long discussion with the patient at her bedside regarding components of a TIA workup and options of observation admission to obtain MRI, ultrasounds and lab work versus outpatient MRI and ultrasounds.  Patient is already on appropriate therapy.  Her ABCD2 score is less than 3, she is on Plavix and a statin.  With shared decision making, patient opts for outpatient workup.  Again, patient is asymptomatic at this time and I feel outpatient management is an appropriate option.  Risks and benefits discussed and patient understands.  This time she is appropriate for discharge home, she remained stable will follow-up with her primary doctor patient is discharged home      ED Course:  ED Course as of 01/09/25 0232   u Jan 09, 2025   0021 Attending summary:  68 y/o F with PMHx HTN, HLD, DM, prior occipital stroke w/ transient prior visual deficits that resolved, STEPHY who is presented as a transfer from OSH for L blurry vision that has resolved. She reports it was \"wavy\" and lasted about 20 minutes. No eye trauma. Has never had similar symptoms, not like prior stroke. No HA, weakness, numbness, speech changes, n/v, difficulty swallowing. OSH obtained labs, CT head without abnormalities. Sent here for ophtho. Vitals unremarkable. Ophthalmologic and neuro exams completely normal. "     The description of event does not sound like classic amaurosis fugax, although this is a consideration with her risk factors and transient nature of symptoms. No neuro deficits, NIH 0. No abnormalities on external exam concerning for periorbital pathology, iritis, angle closure. As symptoms were transient, less consistent with retinal or vitreous detachment/hemorrhage. Will consult ophtho, and then will engage in shared decision making with patient regarding ED MRI versus outpt MRI w/ strict return precautions to fully r/o acute ischemic stroke.  [SS]      ED Course User Index  [SS] Myrna Campo MD         Diagnoses as of 01/09/25 0232   Vision changes       Independent Result Review and Interpretation: Relevant laboratory and radiographic results were reviewed and independently interpreted by myself.  As necessary, they are commented on in the ED Course.    Social Determinants Limiting Care:  None identified      Patient seen by and discussed with the attending emergency medicine physician.       Disposition    Discharge    Jairo Chin DO   Emergency Medicine PGY-3  [facility]      Procedures      Procedures ? SmartLinks last updated 1/9/2025 2:32 AM          Jairo Chin DO  Resident  01/09/25 0231       Myrna Campo MD  01/09/25 0232

## 2025-01-09 NOTE — DISCHARGE INSTRUCTIONS
Please follow up with your primary doctor for outpatient stroke workup, including MRI, cardiac echocardiogram, carotid dopplers, and rest of the workup. Return to the emergency department for any new or worsening symptoms or for any other concerns.

## 2025-01-09 NOTE — CONSULTS
Reason For Consult  Transient vision changes OS    History Of Present Illness  Uyen Naranjo is a 69 y.o. female with PMHx diabetes mellitus (DM), TIA, STEPHY and POHx of CEIOL OU and prior occipital lobe stroke with no residual visual deficits, presenting with transient vision change.     Patient reports today she noticed that in the left eye temporal visual field that straight lines appeared zig-zagged for roughly 20-30 minutes. She did not endorse any positive or negative dysphotopsias, no decreased vision. No headache or prior episodes of this. She has now returned to normal and has no residual vision changes. Notably she had a fall 4 days ago and hit head on drawer but did not have LOC.    Denies any visual complaints including eye pain, diplopia, flashes, floaters, or sudden vision loss.      Past Medical History  She has a past medical history of Dermatochalasis of both upper eyelids, Diabetes mellitus without complication (Multi), Disorder of lipoprotein metabolism, unspecified, History of YAG laser capsulotomy of lens of right eye, Personal history of other diseases of the circulatory system, Personal history of other diseases of the nervous system and sense organs, Personal history of other endocrine, nutritional and metabolic disease, Posterior capsular opacification, left eye, and Unspecified disorder of refraction.    Physical Exam  Base Eye Exam       Visual Acuity (Snellen - Linear)         Right Left    Near cc 20/20 20/20              Tonometry (Tonopen, 12:47 AM)         Right Left    Pressure 10 10              Pupils         Dark Light Shape React APD    Right 4 3 Brisk Roundq -    Left 4 3 Brisk Round -              Extraocular Movement         Right Left     Full, Ortho Full, Ortho              Neuro/Psych       Oriented x3: Yes    Mood/Affect: Normal              Dilation       Both eyes: 1% Mydriacyl & 2.5% Tom  @ 12:47 AM                  Additional Tests       Color         Right Left  "   Katharina 14/14 14/14                  Slit Lamp and Fundus Exam       External Exam         Right Left    External Normal Normal              Slit Lamp Exam         Right Left    Lids/Lashes Normal Normal    Conjunctiva/Sclera White and quiet White and quiet    Cornea Clear Clear    Anterior Chamber Deep and quiet Deep and quiet    Iris Round and reactive Round and reactive    Lens S/P YAG capsulotomy, Posterior chamber intraocular lens cloudy posterior capsule, Posterior chamber intraocular lens    Anterior Vitreous Normal Normal              Fundus Exam         Right Left    Disc Tilted disc, Peripapillary atrophy Tilted disc    C/D Ratio 0.4 0.35    Macula Mottling Normal    Vessels Normal Normal    Periphery Normal Normal                     Last Recorded Vitals  Blood pressure 133/90, pulse 95, temperature 37.1 °C (98.7 °F), resp. rate 16, height 1.676 m (5' 6\"), weight 99.8 kg (220 lb), SpO2 98%.      Assessment/Plan     #TIA   - Normal eye exam today. Given patient's prior medical hx of TIA and occipital lobe stroke, now presenting with transient painless vision changes, most likely diagnosis is TIA  - Recommend continuation of stroke workup per ED      Tyrone Stevenson MD  PGY2 - Ophthalmology     Ophthalmology Adult Pager - [phone]  Ophthalmology Pediatrics Pager - [phone]    For adult follow-up appointments, call: [phone]  For pediatric follow-up appointments, call: [phone]    NOTE: This note is not finalized until attending reviews and signs.   "

## 2025-01-09 NOTE — ED PROVIDER NOTES
"HPI   Chief Complaint   Patient presents with    Change in Vision     Patient states she started having wavy vision in her left eye today for about 20 minutes. She reports she had a fall on Thursday after tripping on a rug in her home and hitting her head on an open drawer. Patient denies any LOC or vision changes immediately after the fall and denies any pain currently. She does take Plavix for a stroke she had 5 years ago.        Patient is a 69-year-old female presents emergency department for evaluation of left eye vision changes to the left eye.  Patient reports that 4 days ago she had a mechanical trip and fall and hit her head.  She is on Plavix.  She did not seek any medical care at that time, but reports that starting an hour to an hour and a half ago around 6 PM she began having some visual changes to the left eye.  She states that she has had haziness and \"squiggly\" sensation to her vision in the left eye has been relatively constant.  She denies any numbness or tingling, headache, or other symptoms at this time.  He notes that she has a history of occipital stroke in the past and therefore she became concerned.      History provided by:  Patient   used: No            Patient History   Past Medical History:   Diagnosis Date    Dermatochalasis of both upper eyelids     Diabetes mellitus without complication (Multi)     Disorder of lipoprotein metabolism, unspecified     Elevated serum cholesterol    History of YAG laser capsulotomy of lens of right eye     2014    Personal history of other diseases of the circulatory system     History of hypertension    Personal history of other diseases of the nervous system and sense organs     History of sleep apnea    Personal history of other endocrine, nutritional and metabolic disease     History of diabetes mellitus    Posterior capsular opacification, left eye     Unspecified disorder of refraction      Past Surgical History:   Procedure " Laterality Date    CATARACT EXTRACTION W/  INTRAOCULAR LENS IMPLANT Bilateral     OD 2007 +14.5D, OS 2020 +16.5D    MR HEAD ANGIO WO IV CONTRAST  2020    MR HEAD ANGIO WO IV CONTRAST [facility]    OTHER SURGICAL HISTORY  2021     section    OTHER SURGICAL HISTORY  2022    Foot surgery    OTHER SURGICAL HISTORY  2022    Tubal ligation    OTHER SURGICAL HISTORY  2022    Eye surgery    OTHER SURGICAL HISTORY  2022    Leg surgery    OTHER SURGICAL HISTORY  2022    Hip surgery     Family History   Problem Relation Name Age of Onset    Cancer Mother Alberta Campbell     Colon cancer Mother Alberta Cimlazarus     Heart disease Father      Kidney disease Father      Cancer Father       Social History     Tobacco Use    Smoking status: Former     Current packs/day: 0.00     Types: Cigarettes     Quit date: 2009     Years since quitting: 15.5    Smokeless tobacco: Never   Vaping Use    Vaping status: Never Used   Substance Use Topics    Alcohol use: Yes     Comment: 1 or 2 drinks per month    Drug use: Yes     Frequency: 2.0 times per week     Types: Marijuana     Comment: gummies - I do not smoke       Physical Exam   ED Triage Vitals [25]   Temperature Heart Rate Respirations BP   36.6 °C (97.9 °F) (!) 118 18 154/87      Pulse Ox Temp Source Heart Rate Source Patient Position   97 % Temporal Monitor Sitting      BP Location FiO2 (%)     Right arm --       Physical Exam  Constitutional:       Appearance: Normal appearance.   Eyes:      Extraocular Movements: Extraocular movements intact.      Pupils: Pupils are equal, round, and reactive to light.   Cardiovascular:      Rate and Rhythm: Normal rate and regular rhythm.   Pulmonary:      Effort: Pulmonary effort is normal.      Breath sounds: Normal breath sounds.   Abdominal:      General: Abdomen is flat.      Palpations: Abdomen is soft.      Tenderness: There is no abdominal tenderness.    Musculoskeletal:         General: Normal range of motion.   Skin:     General: Skin is warm and dry.   Neurological:      General: No focal deficit present.      Mental Status: She is alert and oriented to person, place, and time.      Comments: NIHSS 0.  Visual fields intact with peripheral vision intact with no miguel visual field deficits.  Vision intact to confrontation bilaterally.           ED Course & MDM   ED Course as of 01/08/25 2150 Wed Jan 08, 2025 2041 Spoke with telestroke neurology Dr. Cano. Less likely stroke does not recommend TNK/TPA and states likely non-emergent MRI tomorrow to ensure no acute findings.  When speaking with ophthalmology to determine if this is ophthalmologic origin moving forward specially given that typically neurologically patient would have negative symptoms and vision loss and with vision involvement likely PCA origin which would affect both eyes and not just 1 eye. [AF]   2115 Spoke with ophthalmology Dr. Warren who feels patient requires dilated eye exam and further evaluation by ophthalmology given her symptoms and recommends ED to ED transfer.  Then spoke with ED attending Dr. Grimm who accepted patient for transfer. [AF]      ED Course User Index  [AF] Megan Mars PA-C         Diagnoses as of 01/08/25 2150   Changes in vision                 No data recorded     Shantell Coma Scale Score: 15 (01/08/25 2130 : Angela L Alesch, RN)       NIH Stroke Scale: 0 (01/08/25 2130 : Angela L Alesch, MARIANA)                   Medical Decision Making  Patient is a 69-year-old female presents emergency department for evaluation of left eye vision changes starting around 6 PM.    EKG was interpreted by attending physician and reviewed by me showing...    Lab work done today included CMP, CBC, troponins, proBNP, lactate, PT/INR, urinalysis.    Scans done today were interpreted/confirmed by radiologist and also interpreted by me which included chest x-ray.,  CT cervical spine  "without contrast, CT angio head and neck with and without contrast..    Medications not given at today's visit.    I saw this patient in conjunction with Dr. Erwin.  Patient remained stable in the emergency department.  She has no miguel visual field deficits, but persistent haziness and \"squiggly\" lines in her vision.  She has NIH of 0, but given her symptoms code brain attack was initiated.  Patient did not meet criteria for TNK or tPA given her NIH score of 0.  CT imaging shows no evidence for acute intracranial abnormality with no hemodynamically significant intracranial or extracranial stenosis, occlusion, dissection, or aneurysm.  Given her findings I did speak with telestroke neurology Dr. Cano discussed case.  Given that symptoms only affecting 1 eye and not negative deficits, but rather positive symptoms of haziness and squiggly lines feels this is less likely to be neurologic in origin, but recommends nonemergent MRI to ensure no acute pathology.  Recommend speaking with ophthalmology as this likely may be due to ophthalmologic cause rather than neurologic cause.  I then spoke with ophthalmology at [facility] Dr. Warren to review case and patient and he recommends ED to ED transfer for patient to have dilated eye exam and more formal ophthalmologic evaluation moving forward.  I then spoke with ED attending Dr. Grimm to review case and she is agreeable and accepting of patient moving forward.  Patient was updated on findings and recommendations at this time and agreeable to transfer.  Risk for cough grossly unremarkable and patient otherwise hemodynamically stable.  She ultimately was transferred to [facility] ED for further management.    The patient/family was counseled on clinical impression, expectations, and plan along with recommendations to transfer.  All questions were answered and involved parties were understanding and agreeable to course of treatment.  Case was discussed with accepting " physician and any consultants. Patient stable for transfer per my assessment and further management of patient will be deferred to accepting hospital.    ** Disclaimer:  Parts of this document were written utilizing a voice to text dictation software.  Note may contain minor transcription or typographical errors that were inadvertently transcribed by the computer software.        Procedure  Procedures     Megan Mars PA-C  01/08/25 6735

## 2025-01-09 NOTE — PROGRESS NOTES
Attestation/Supervisory note for ALESIA Mars      The patient is a 69-year-old female presenting to the emergency department for evaluation of a visual disturbance in the left eye that started approximately 1 hour prior to arrival.  She states that she noticed a hazy/weekly line in her vision.  She states that she does have a history of previous occipital strokes so she was concerned and came to the emergency room.  She does take Plavix daily.  She also mentions that she did have a fall with a head injury 4 days ago.  She states that she did not seek any treatment at that time.  She did hit her head but did not lose consciousness.  She denies any headache or neck pain.  No focal weakness or numbness.  No difficulty swallowing or speaking.  No chest pain or shortness of breath.  No abdominal pain.  No nausea vomiting.  No diarrhea or constipation.  No urinary complaints.  All pertinent positives and negatives are recorded above.  All other systems reviewed and otherwise negative.  Vital signs with hypertension and tachycardia but otherwise within normal limits.  Physical exam with a well-nourished well-developed female in no acute distress.  HEENT exam within normal limits.  Confrontation fields are full to count fingers.  She has no evidence of airway compromise or respiratory distress.  She is mildly tachycardic.  Abdominal exam is benign.  She does not have any gross motor, neurologic or vascular deficits on exam.  She does have equal pulses bilaterally.  She does have bilateral lower extremity pitting edema.  NIH stroke score of 0.      Code brain attack was activated      tPA/TNK is not indicated given NIH stroke scale score of 0 at this time.      EKG with sinus tachycardia at 112 bpm, first-degree block, low voltage, normal axis, normal ST segment, no diffuse flattening of the T waves      Diagnostic labs without significant abnormality      Initial troponin < 3.  Repeat troponin < 3      Urinalysis was in  progress at the time of transfer      XR chest 1 view   Final Result   No acute cardiopulmonary process.             MACRO:   None.        Signed by: Fox Swanson 1/8/2025 9:31 PM   Dictation workstation:   [id number]      CT cervical spine wo IV contrast   Final Result   No acute intracranial abnormality.        No hemodynamically significant intracranial or extracranial stenosis,   occlusion, dissection, or aneurysm.        No acute fracture or traumatic malalignment of the cervical spine.             MACRO:   Fox Swanson discussed the significance and urgency of this   critical finding via Luv Rink with ALESIA Dior on   1/8/2025 at 8:32 pm.  (**-RCF-**) Findings:  See findings.        Signed by: Fox Swanson 1/8/2025 8:33 PM   Dictation workstation:   [id number]      CT brain attack angio head and neck W and WO IV contrast   Final Result   No acute intracranial abnormality.        No hemodynamically significant intracranial or extracranial stenosis,   occlusion, dissection, or aneurysm.        No acute fracture or traumatic malalignment of the cervical spine.             MACRO:   Fox Swanson discussed the significance and urgency of this   critical finding via Luv Rink with ALESIA Dior on   1/8/2025 at 8:32 pm.  (**-RCF-**) Findings:  See findings.        Signed by: Fox Swanson 1/8/2025 8:33 PM   Dictation workstation:   [id number]      CT brain attack head wo IV contrast   Final Result   No acute intracranial abnormality.        No hemodynamically significant intracranial or extracranial stenosis,   occlusion, dissection, or aneurysm.        No acute fracture or traumatic malalignment of the cervical spine.             MACRO:   Fox Swanson discussed the significance and urgency of this   critical finding via Luv Rink with ALESIA Dior on   1/8/2025 at 8:32 pm.  (**-RCF-**) Findings:  See findings.        Signed by:  Fox Swanson 1/8/2025 8:33 PM   Dictation workstation:   [id number]           The patient does not have any evidence of ischemia on EKG or cardiac enzymes.  She was mildly tachycardic but had no other events on telemetry.  She does not have any gross motor, neurologic or vascular deficits on exam.  NIH stroke scale score of 0 at this time.  Chest x-ray without acute process.  No evidence of pneumonia or pneumothorax.  No evidence of CHF.  No widening of the mediastinum.  Pulses are equal bilaterally.  CT head without evidence of acute process.  No evidence of intracranial hemorrhage, mass effect or CVA.  The angio head and neck showed no evidence of large vessel occlusion.  The case was discussed with telestroke neurology, Dr Cano,  and ophthalmology at [facility].  Dr. Warren, ophthalmology, requested that the patient be transferred from our emergency room to their emergency room for ophthalmologic evaluation.  Dr. Grimm, emergency department attending, accepted the patient for transfer.      Impression/diagnosis:  1.  Visual disturbance, left eye  2.  Hypertension, unspecified  3.  Fall from standing height, initial encounter  4.  Closed head injury, initial encounter        Critical care time of 33 minutes billed for management of the activation of the code brain attack, assessment of the patient with the NIH stroke scale, consideration of tPA inclusion and exclusion criteria, consultation with telestroke neurology, consultation with the ophthalmologist, consultation with excepting provider, consultation with the patient regarding her results, monitoring the patient on telemetry..  This time excludes time for billable procedures.      critical care time billed for by me is non concurrent with time billed for by ALESIA Mars      I personally saw the patient and made/approve the management plan and take responsibility for the patient management.      I independently interpreted the following study  (S) EKG and diagnostic labs      I personally discussed the patient's management with the patient      I reviewed the results of the diagnostic labs and diagnostic imaging.  Formal radiology read was completed by the radiologist.      Fang Erwin MD

## 2025-01-09 NOTE — ED PROCEDURE NOTE
Procedure  Critical Care    Performed by: Fang Erwin MD  Authorized by: Fang Erwin MD    Critical care provider statement:     Critical care time (minutes):  33    Critical care time was exclusive of:  Separately billable procedures and treating other patients and teaching time    Critical care was necessary to treat or prevent imminent or life-threatening deterioration of the following conditions: Code brain attack.    Critical care was time spent personally by me on the following activities:  Blood draw for specimens, development of treatment plan with patient or surrogate, discussions with consultants, discussions with primary provider, evaluation of patient's response to treatment, examination of patient, review of old charts, re-evaluation of patient's condition, pulse oximetry, ordering and review of radiographic studies, ordering and review of laboratory studies and ordering and performing treatments and interventions    Care discussed with: accepting provider at another facility    Comments:      Critical care time of 33 minutes billed for management of the activation of the code brain attack, assessment of the patient with the NIH stroke scale, consideration of tPA inclusion and exclusion criteria, consultation with telestroke neurology, consultation with the ophthalmologist, consultation with excepting provider, consultation with the patient regarding her results, monitoring the patient on telemetry..  This time excludes time for billable procedures.      critical care time billed for by me is non concurrent with time billed for by ALESIA Erwin MD  01/08/25 9330

## 2025-01-09 NOTE — TELECONSULT
HPI:  69 y.o. female with DM and remote TIA presented with zig-zag lines in the L eye temporal visual field lasting 20-30 minutes.     Last known Well: N/A (back to baseline)  NIH Stroke Scale Reported: 0    Prior Functional Status (Modified Turtlepoint Scale):  0 No symptoms at all     Imaging Results:  Head CT: CT brain attack head wo IV contrast    Result Date: 1/8/2025  No acute intracranial abnormality.   No hemodynamically significant intracranial or extracranial stenosis, occlusion, dissection, or aneurysm.   No acute fracture or traumatic malalignment of the cervical spine.     MACRO: Fox Swanson discussed the significance and urgency of this critical finding via [employer] with ALESIA Dior on 1/8/2025 at 8:32 pm.  (**-RCF-**) Findings:  See findings.   Signed by: Fox Swanson 1/8/2025 8:33 PM Dictation workstation:   [id number]    CT brain attack angio head and neck W and WO IV contrast    Result Date: 1/8/2025  No acute intracranial abnormality.   No hemodynamically significant intracranial or extracranial stenosis, occlusion, dissection, or aneurysm.   No acute fracture or traumatic malalignment of the cervical spine.     MACRO: Fox Swanson discussed the significance and urgency of this critical finding via [employer] with ALESIA Dior on 1/8/2025 at 8:32 pm.  (**-RCF-**) Findings:  See findings.   Signed by: Fox Swanson 1/8/2025 8:33 PM Dictation workstation:   [id number]      Assessment:   Working Diagnosis:   Not a stroke        Recommendations:   IV tPA is not recommended. Rationale: no deficits currently     Patient is NOT a candidate for endovascular treatment.  Rationale: no lvo     Additional Recommendations:  Outpatient follow up, consider ophthalmology consultation     Consult completed by:  TELEPHONE communication was used to provide this telehealth service.  Time includes consultation with ED provider and extensive review of data- history,  medical records, test results, and neuroimaging studies: 5 - 10 mins was spent in consultation

## 2025-01-14 ENCOUNTER — TELEPHONE (OUTPATIENT)
Dept: CARDIOLOGY | Facility: CLINIC | Age: 70
End: 2025-01-14
Payer: MEDICARE

## 2025-01-14 NOTE — ASSESSMENT & PLAN NOTE
Patient presented to ER with change of vision, wavy lines, in one eye on Jan 5. Symptoms resolved and felt to have suffered a TIA.  Patient is scheduled to see a neurologist at the [facility] next month.  Have ordered an echocardiogram to look for a cardiac source of embolus.

## 2025-01-14 NOTE — TELEPHONE ENCOUNTER
Patient requested a switch of appointments with her sister. Spoke with Uyen and confirmed her appt is now 1/16 at 10:15 and Kari Dick appt is 1/30 at 1:45 p.m. Also made Kari aware

## 2025-01-16 ENCOUNTER — OFFICE VISIT (OUTPATIENT)
Dept: CARDIOLOGY | Facility: CLINIC | Age: 70
End: 2025-01-16
Payer: MEDICARE

## 2025-01-16 VITALS
DIASTOLIC BLOOD PRESSURE: 62 MMHG | OXYGEN SATURATION: 97 % | SYSTOLIC BLOOD PRESSURE: 112 MMHG | HEART RATE: 117 BPM | BODY MASS INDEX: 35.51 KG/M2 | WEIGHT: 220 LBS

## 2025-01-16 DIAGNOSIS — R93.1 ELEVATED CORONARY ARTERY CALCIUM SCORE: Primary | ICD-10-CM

## 2025-01-16 DIAGNOSIS — E78.2 MIXED HYPERLIPIDEMIA: ICD-10-CM

## 2025-01-16 DIAGNOSIS — I47.10 SVT (SUPRAVENTRICULAR TACHYCARDIA) (CMS-HCC): ICD-10-CM

## 2025-01-16 DIAGNOSIS — G45.9 TIA (TRANSIENT ISCHEMIC ATTACK): ICD-10-CM

## 2025-01-16 DIAGNOSIS — I10 BENIGN ESSENTIAL HYPERTENSION: ICD-10-CM

## 2025-01-16 DIAGNOSIS — I63.9 CRYPTOGENIC STROKE (MULTI): ICD-10-CM

## 2025-01-16 PROCEDURE — 99214 OFFICE O/P EST MOD 30 MIN: CPT | Performed by: INTERNAL MEDICINE

## 2025-01-16 PROCEDURE — 1036F TOBACCO NON-USER: CPT | Performed by: INTERNAL MEDICINE

## 2025-01-16 PROCEDURE — 1159F MED LIST DOCD IN RCRD: CPT | Performed by: INTERNAL MEDICINE

## 2025-01-16 PROCEDURE — 3074F SYST BP LT 130 MM HG: CPT | Performed by: INTERNAL MEDICINE

## 2025-01-16 PROCEDURE — 3078F DIAST BP <80 MM HG: CPT | Performed by: INTERNAL MEDICINE

## 2025-01-16 PROCEDURE — 1126F AMNT PAIN NOTED NONE PRSNT: CPT | Performed by: INTERNAL MEDICINE

## 2025-01-16 PROCEDURE — 1157F ADVNC CARE PLAN IN RCRD: CPT | Performed by: INTERNAL MEDICINE

## 2025-01-16 ASSESSMENT — ENCOUNTER SYMPTOMS
SHORTNESS OF BREATH: 0
PARESTHESIAS: 0
COUGH: 0
OCCASIONAL FEELINGS OF UNSTEADINESS: 1
ABDOMINAL PAIN: 0
DYSPNEA ON EXERTION: 0
PALPITATIONS: 0
DEPRESSION: 0
HEMATURIA: 0
LOSS OF SENSATION IN FEET: 0
NUMBNESS: 0
DYSURIA: 0
BLURRED VISION: 1

## 2025-01-16 ASSESSMENT — LIFESTYLE VARIABLES: TOTAL SCORE: 0

## 2025-01-16 ASSESSMENT — PAIN SCALES - GENERAL: PAINLEVEL_OUTOF10: 0-NO PAIN

## 2025-01-16 NOTE — ASSESSMENT & PLAN NOTE
Blood pressure now is very well-controlled on current regimen and no change will be made at this time.

## 2025-01-16 NOTE — PROGRESS NOTES
Subjective   Uyen Naranjo is a 69 y.o. female.    Chief Complaint:  Hospital Follow-up    HPI  Patient had a transient episode of waviness in one of her eyes.  This lasted for about 10 minutes but prompted her to report to the emergency department.  She was transferred to [facility] to see an ophthalmologist who found no significant abnormalities.  She thus was given the diagnosis of a T high a total MRI of the head also was negative.  She has been scheduled to see a neurologist at the [facility] next month and will await his input.    Review of Systems   Constitutional: Negative for malaise/fatigue.   HENT:  Negative for congestion.    Eyes:  Positive for blurred vision.   Cardiovascular:  Negative for chest pain, dyspnea on exertion and palpitations.   Respiratory:  Negative for cough and shortness of breath.    Musculoskeletal:  Negative for joint pain.   Gastrointestinal:  Negative for abdominal pain.   Genitourinary:  Negative for dysuria and hematuria.   Neurological:  Negative for numbness and paresthesias.       Objective   Constitutional:       Appearance: Not in distress.   Eyes:      Conjunctiva/sclera: Conjunctivae normal.   Neck:      Vascular: JVD normal.   Pulmonary:      Breath sounds: Normal breath sounds. No wheezing. No rhonchi. No rales.   Cardiovascular:      Normal rate. Regular rhythm.      Murmurs: There is no murmur.      No gallop.  No click. No rub.   Abdominal:      Palpations: Abdomen is soft.   Neurological:      General: No focal deficit present.      Mental Status: Alert.         Lab Review:   Admission on 01/08/2025, Discharged on 01/08/2025   Component Date Value    Ventricular Rate 01/08/2025 112     Atrial Rate 01/08/2025 112     TN Interval 01/08/2025 214     QRS Duration 01/08/2025 72     QT Interval 01/08/2025 310     QTC Calculation(Bazett) 01/08/2025 423     P Axis 01/08/2025 22     R Axis 01/08/2025 -27     T Axis  01/08/2025 16     QRS Count 01/08/2025 19     Q Onset 01/08/2025 230     P Onset 01/08/2025 123     P Offset 01/08/2025 159     T Offset 01/08/2025 385     QTC Fredericia 01/08/2025 381     WBC 01/08/2025 9.5     nRBC 01/08/2025 0.0     RBC 01/08/2025 5.34 (H)     Hemoglobin 01/08/2025 14.6     Hematocrit 01/08/2025 43.2     MCV 01/08/2025 81     MCH 01/08/2025 27.3     MCHC 01/08/2025 33.8     RDW 01/08/2025 14.4     Platelets 01/08/2025 231     Neutrophils % 01/08/2025 58.9     Immature Granulocytes %,* 01/08/2025 0.3     Lymphocytes % 01/08/2025 31.9     Monocytes % 01/08/2025 7.6     Eosinophils % 01/08/2025 0.8     Basophils % 01/08/2025 0.5     Neutrophils Absolute 01/08/2025 5.61 (H)     Immature Granulocytes Ab* 01/08/2025 0.03     Lymphocytes Absolute 01/08/2025 3.04 (H)     Monocytes Absolute 01/08/2025 0.72     Eosinophils Absolute 01/08/2025 0.08     Basophils Absolute 01/08/2025 0.05     Glucose 01/08/2025 126 (H)     Sodium 01/08/2025 139     Potassium 01/08/2025 3.5     Chloride 01/08/2025 105     Bicarbonate 01/08/2025 24     Anion Gap 01/08/2025 14     Urea Nitrogen 01/08/2025 21     Creatinine 01/08/2025 0.85     eGFR 01/08/2025 73     Calcium 01/08/2025 9.5     Albumin 01/08/2025 4.2     Alkaline Phosphatase 01/08/2025 73     Total Protein 01/08/2025 7.1     AST 01/08/2025 16     Bilirubin, Total 01/08/2025 0.3     ALT 01/08/2025 19     Lactate 01/08/2025 1.6     Protime 01/08/2025 10.9     INR 01/08/2025 1.1     BNP 01/08/2025 18     Color, Urine 01/08/2025 Light-Yellow     Appearance, Urine 01/08/2025 Clear     Specific Gravity, Urine 01/08/2025 >1.050 (N)     pH, Urine 01/08/2025 7.0     Protein, Urine 01/08/2025 10 (TRACE)     Glucose, Urine 01/08/2025 Normal     Blood, Urine 01/08/2025 NEGATIVE     Ketones, Urine 01/08/2025 NEGATIVE     Bilirubin, Urine 01/08/2025 NEGATIVE     Urobilinogen, Urine 01/08/2025 Normal     Nitrite, Urine 01/08/2025 NEGATIVE     Leukocyte Esterase, Urine 01/08/2025  NEGATIVE     Troponin I, High Sensiti* 01/08/2025 <3     Troponin I, High Sensiti* 01/08/2025 <3     POCT Glucose 01/08/2025 134 (H)     WBC, Urine 01/08/2025 1-5     RBC, Urine 01/08/2025 1-2        Assessment/Plan   The primary encounter diagnosis was Elevated coronary artery calcium score. Diagnoses of Benign essential hypertension, Mixed hyperlipidemia, SVT (supraventricular tachycardia) (CMS-HCC), TIA (transient ischemic attack), and Cryptogenic stroke (Multi) were also pertinent to this visit.    TIA (transient ischemic attack)  Patient presented to ER with change of vision, wavy lines, in one eye on Jan 5. Symptoms resolved and felt to have suffered a TIA.  Patient is scheduled to see a neurologist at the [facility] next month.  Have ordered an echocardiogram to look for a cardiac source of embolus.    Benign essential hypertension  Blood pressure now is very well-controlled on current regimen and no change will be made at this time.    Elevated coronary artery calcium score  Will continue standard risk factor modification and follow on a clinical basis.    Hyperlipidemia  Dr. Marie recently checked a lipid panel when the LDL cholesterol was 50, very good.  Continue the rosuvastatin at 40 mg once daily.    SVT (supraventricular tachycardia) (CMS-HCC)  No symptomatic recurrences.  Will await neurology evaluation.  Will see if they believe the patient's symptoms were from a TIA or possibly an ocular migraine and whether any further recommendations on cardiac testing is recommended.  We will order an echocardiogram to assess cardiac chamber sizes left-ventricular systolic function and valvular status.  Will call patient if necessary.

## 2025-01-16 NOTE — ASSESSMENT & PLAN NOTE
Dr. Marie recently checked a lipid panel when the LDL cholesterol was 50, very good.  Continue the rosuvastatin at 40 mg once daily.

## 2025-01-16 NOTE — ASSESSMENT & PLAN NOTE
No symptomatic recurrences.  Will await neurology evaluation.  Will see if they believe the patient's symptoms were from a TIA or possibly an ocular migraine and whether any further recommendations on cardiac testing is recommended.  We will order an echocardiogram to assess cardiac chamber sizes left-ventricular systolic function and valvular status.  Will call patient if necessary.

## 2025-01-20 ENCOUNTER — HOSPITAL ENCOUNTER (OUTPATIENT)
Dept: CARDIOLOGY | Facility: HOSPITAL | Age: 70
Discharge: HOME | End: 2025-01-20
Payer: MEDICARE

## 2025-01-20 ENCOUNTER — APPOINTMENT (OUTPATIENT)
Dept: OTOLARYNGOLOGY | Facility: CLINIC | Age: 70
End: 2025-01-20
Payer: MEDICARE

## 2025-01-20 VITALS — BODY MASS INDEX: 36.96 KG/M2 | WEIGHT: 230 LBS | HEIGHT: 66 IN

## 2025-01-20 DIAGNOSIS — G45.9 TIA (TRANSIENT ISCHEMIC ATTACK): ICD-10-CM

## 2025-01-20 DIAGNOSIS — G47.33 OBSTRUCTIVE SLEEP APNEA: ICD-10-CM

## 2025-01-20 DIAGNOSIS — R93.0 ABNORMAL CT SCAN OF HEAD: Primary | ICD-10-CM

## 2025-01-20 DIAGNOSIS — J31.0 CHRONIC RHINITIS: ICD-10-CM

## 2025-01-20 PROCEDURE — 3008F BODY MASS INDEX DOCD: CPT | Performed by: OTOLARYNGOLOGY

## 2025-01-20 PROCEDURE — 93306 TTE W/DOPPLER COMPLETE: CPT

## 2025-01-20 PROCEDURE — 93306 TTE W/DOPPLER COMPLETE: CPT | Performed by: INTERNAL MEDICINE

## 2025-01-20 PROCEDURE — 1159F MED LIST DOCD IN RCRD: CPT | Performed by: OTOLARYNGOLOGY

## 2025-01-20 PROCEDURE — 1157F ADVNC CARE PLAN IN RCRD: CPT | Performed by: OTOLARYNGOLOGY

## 2025-01-20 PROCEDURE — 1036F TOBACCO NON-USER: CPT | Performed by: OTOLARYNGOLOGY

## 2025-01-20 PROCEDURE — 99214 OFFICE O/P EST MOD 30 MIN: CPT | Performed by: OTOLARYNGOLOGY

## 2025-01-20 NOTE — PROGRESS NOTES
Chief Complaint   Patient presents with    Results     LOV: 2024 BRAIN MRI RESULTS FROM , POSSIBLE SINUS CYST     HPI:  Uyen Naranjo is a 69 y.o. female who presents today for evaluation of her sinuses.  She was having some strokelike symptoms earlier this month and had some imaging performed which fortunately was negative.  Showed incidental finding of some asymptomatic sinus inflammation and some mild left mastoid fluid.    No otologic or sinus complaints.    Atrovent nasal spray is working great.    History of obstructive sleep apnea with great CPAP compliance.   History of obstructive sleep apnea which was diagnosed in the distant past.   Retested .   Continues to have sleep apnea with an AHI of 12.4.   She was titrated and the best pressure was from 12 to 18 cm of water.   She uses this 100% of nights.   She loves it.     PMH:  Past Medical History:   Diagnosis Date    Asthma     Dermatochalasis of both upper eyelids     Diabetes mellitus without complication (Multi)     Disorder of lipoprotein metabolism, unspecified     Elevated serum cholesterol    History of YAG laser capsulotomy of lens of right eye         Hypertension     Personal history of other diseases of the circulatory system     History of hypertension    Personal history of other diseases of the nervous system and sense organs     History of sleep apnea    Personal history of other endocrine, nutritional and metabolic disease     History of diabetes mellitus    Posterior capsular opacification, left eye     Sleep apnea     Stroke (Multi)     Unspecified disorder of refraction      Past Surgical History:   Procedure Laterality Date    CATARACT EXTRACTION W/  INTRAOCULAR LENS IMPLANT Bilateral     OD 2007 +14.5D, OS 2020 +16.5D    MR HEAD ANGIO WO IV CONTRAST  2020    MR HEAD ANGIO WO IV CONTRAST [facility]    OTHER SURGICAL HISTORY  2021     section    OTHER SURGICAL HISTORY   08/22/2022    Foot surgery    OTHER SURGICAL HISTORY  08/22/2022    Tubal ligation    OTHER SURGICAL HISTORY  08/22/2022    Eye surgery    OTHER SURGICAL HISTORY  08/22/2022    Leg surgery    OTHER SURGICAL HISTORY  08/22/2022    Hip surgery         Medications:     Current Outpatient Medications:     albuterol (ProAir HFA) 90 mcg/actuation inhaler, Inhale 2 puffs 3 times a day as needed., Disp: , Rfl:     amLODIPine (Norvasc) 5 mg tablet, Take 1 tablet (5 mg) by mouth once daily., Disp: 90 tablet, Rfl: 3    blood sugar diagnostic strip, 1-2 strips., Disp: , Rfl:     budesonide-formoteroL (Symbicort) 160-4.5 mcg/actuation inhaler, Inhale 2 puffs 2 times a day., Disp: , Rfl:     cholecalciferol (Vitamin D-3) 5,000 Units tablet, Take by mouth. As directed, Disp: , Rfl:     clopidogrel (Plavix) 75 mg tablet, Take 1 tablet (75 mg) by mouth once daily., Disp: 90 tablet, Rfl: 3    cyanocobalamin, vitamin B-12, (VITAMIN B-12 ORAL), Place under the tongue. 3x/wk, Disp: , Rfl:     escitalopram (Lexapro) 10 mg tablet, Take 1 tablet (10 mg) by mouth once daily., Disp: , Rfl:     fluticasone propion-salmeteroL (Advair Diskus) 250-50 mcg/dose diskus inhaler, Inhale 1 puff 2 times a day., Disp: , Rfl:     ipratropium (Atrovent) 42 mcg (0.06 %) nasal spray, 2 sprays in each nostril every 6 hours as needed for runny nose, 3 bottle, 3 refills, Disp: 45 mL, Rfl: 3    levothyroxine (Synthroid) 75 mcg tablet, Take 1 tablet (75 mcg) by mouth once daily., Disp: , Rfl:     magnesium citrate and oxide (magnesium citrate,mag oxide) 250 mg capsule, Take 2 capsules by mouth once daily. WITH MEAL, Disp: , Rfl:     metFORMIN (Glucophage) 500 mg tablet, Take 2 tablets (1,000 mg) by mouth once daily., Disp: , Rfl:     Mounjaro 15 mg/0.5 mL pen injector, , Disp: , Rfl:     potassium chloride CR 10 mEq ER tablet, , Disp: , Rfl:     rosuvastatin (Crestor) 40 mg tablet, Take 1 tablet (40 mg) by mouth once daily., Disp: 90 tablet, Rfl: 3     "triamterene-hydrochlorothiazid (Maxzide) 75-50 mg tablet, Take 1 tablet by mouth once daily., Disp: 90 tablet, Rfl: 3     Allergies:  Allergies   Allergen Reactions    Dilaudid [Hydromorphone] Anaphylaxis    Other Shortness of breath     Rabbit hair    Rabbit Shortness of breath    Animal Dander Other    Morphine Itching    Penicillin Rash    Sulfa (Sulfonamide Antibiotics) Other and GI Upset     Nausea , GI upset    Tetracycline Hcl Rash        ROS:  Review of systems normal unless stated otherwise in the HPI and/or PMH.    Physical Exam:  Height 1.676 m (5' 6\"), weight 104 kg (230 lb). Body mass index is 37.12 kg/m².     GENERAL APPEARANCE: Well developed and well nourished.  Alert and oriented in no acute distress.  Normal vocal quality.      HEAD/FACE: No erythema or edema or facial tenderness.  Normal facial nerve function bilaterally.    EAR:       EXTERNAL: Normal pinnas and external auditory canals without lesion or obstructing wax.       MIDDLE EAR: Tympanic membranes intact and mobile with normal landmarks.  Middle ear space appears well aerated.       TUBE STATUS: N/A       MASTOID CAVITY: N/A       HEARING: Gross hearing assessment is within normal limits.      NOSE:       VISUALIZED USING: Anterior rhinoscopy with headlight and nasal speculum.       DORSUM: Midline, nontraumatic appearance.       MUCOSA: Normal-appearing.       SECRETIONS: Normal.       SEPTUM: Midline and nonobstructing.       INFERIOR TURBINATES: Normal.       MIDDLE TURBINATES/MEATUS: N/A       BLEEDING: N/A         ORAL CAVITY/PHARYNX:       TEETH: Adequate dentition.       TONGUE: No mass or lesion.  Normal mobility.       FLOOR OF MOUTH: No mass or lesion.       PALATE: Normal hard palate, soft palate, and uvula.       OROPHARYNX: Normal without mass or lesion.       BUCCAL MUCOSA/GBS: Normal without mass or lesion.       LIPS: Normal.    LARYNX/HYPOPHARYNX/NASOPHARYNX: N/A    NECK: No palpable masses or abnormal adenopathy.  " Trachea is midline.    THYROID: No thyromegaly or palpable nodule.    SALIVARY GLANDS: Normal bilateral parotid and submandibular glands by inspection and palpation.    TMJ's: Normal.    NEURO: Cranial nerve exam grossly normal bilaterally.       Assessment/Plan   Uyen was seen today for results.  Diagnoses and all orders for this visit:  Abnormal CT scan of head (Primary)  Chronic rhinitis  Obstructive sleep apnea    Incidental finding of some sinus and left mastoid inflammation which is asymptomatic.  Normal examination today.  No treatment necessary.  Continue Atrovent.  Doing great with her CPAP compliance and clinical effect.  She will continue.  Follow up in about 1 year (around 1/20/2026).     Luis Arthur MD

## 2025-01-23 LAB
AORTIC VALVE MEAN GRADIENT: 3 MMHG
AORTIC VALVE PEAK VELOCITY: 1.25 M/S
AV PEAK GRADIENT: 6 MMHG
AVA (PEAK VEL): 2.54 CM2
AVA (VTI): 2.37 CM2
EJECTION FRACTION APICAL 4 CHAMBER: 60.6
EJECTION FRACTION: 63 %
LEFT ATRIUM VOLUME AREA LENGTH INDEX BSA: 13.3 ML/M2
LEFT VENTRICLE INTERNAL DIMENSION DIASTOLE: 3.75 CM (ref 3.5–6)
LEFT VENTRICULAR OUTFLOW TRACT DIAMETER: 2 CM
LV EJECTION FRACTION BIPLANE: 62 %
MITRAL VALVE E/A RATIO: 0.85
RIGHT VENTRICLE FREE WALL PEAK S': 13.3 CM/S
RIGHT VENTRICLE PEAK SYSTOLIC PRESSURE: 22.2 MMHG
TRICUSPID ANNULAR PLANE SYSTOLIC EXCURSION: 2.8 CM

## 2025-01-30 ENCOUNTER — APPOINTMENT (OUTPATIENT)
Dept: CARDIOLOGY | Facility: CLINIC | Age: 70
End: 2025-01-30
Payer: MEDICARE

## 2025-02-13 ENCOUNTER — APPOINTMENT (OUTPATIENT)
Dept: CARDIOLOGY | Facility: CLINIC | Age: 70
End: 2025-02-13
Payer: MEDICARE

## 2025-05-30 LAB
CHOLEST SERPL-MCNC: 154 MG/DL
CHOLEST/HDLC SERPL: 2.2 (CALC)
HDLC SERPL-MCNC: 71 MG/DL
LDLC SERPL CALC-MCNC: 63 MG/DL (CALC)
NONHDLC SERPL-MCNC: 83 MG/DL (CALC)
TRIGL SERPL-MCNC: 121 MG/DL

## 2025-06-06 ENCOUNTER — APPOINTMENT (OUTPATIENT)
Dept: CARDIOLOGY | Facility: CLINIC | Age: 70
End: 2025-06-06
Payer: MEDICARE

## 2025-06-11 ENCOUNTER — OFFICE VISIT (OUTPATIENT)
Facility: CLINIC | Age: 70
End: 2025-06-11
Payer: MEDICARE

## 2025-06-11 VITALS
DIASTOLIC BLOOD PRESSURE: 70 MMHG | SYSTOLIC BLOOD PRESSURE: 112 MMHG | WEIGHT: 224 LBS | BODY MASS INDEX: 36.15 KG/M2 | HEART RATE: 105 BPM | OXYGEN SATURATION: 95 %

## 2025-06-11 DIAGNOSIS — E78.2 MIXED HYPERLIPIDEMIA: ICD-10-CM

## 2025-06-11 DIAGNOSIS — I10 BENIGN ESSENTIAL HYPERTENSION: ICD-10-CM

## 2025-06-11 DIAGNOSIS — E66.01 MORBID OBESITY (MULTI): ICD-10-CM

## 2025-06-11 DIAGNOSIS — I47.10 SVT (SUPRAVENTRICULAR TACHYCARDIA): Primary | ICD-10-CM

## 2025-06-11 DIAGNOSIS — R93.1 ELEVATED CORONARY ARTERY CALCIUM SCORE: ICD-10-CM

## 2025-06-11 DIAGNOSIS — Z95.9 CARDIAC DEVICE IN SITU: ICD-10-CM

## 2025-06-11 PROCEDURE — 1126F AMNT PAIN NOTED NONE PRSNT: CPT | Performed by: INTERNAL MEDICINE

## 2025-06-11 PROCEDURE — 3078F DIAST BP <80 MM HG: CPT | Performed by: INTERNAL MEDICINE

## 2025-06-11 PROCEDURE — G2211 COMPLEX E/M VISIT ADD ON: HCPCS | Performed by: INTERNAL MEDICINE

## 2025-06-11 PROCEDURE — 1036F TOBACCO NON-USER: CPT | Performed by: INTERNAL MEDICINE

## 2025-06-11 PROCEDURE — 1159F MED LIST DOCD IN RCRD: CPT | Performed by: INTERNAL MEDICINE

## 2025-06-11 PROCEDURE — 99213 OFFICE O/P EST LOW 20 MIN: CPT | Performed by: INTERNAL MEDICINE

## 2025-06-11 PROCEDURE — 3074F SYST BP LT 130 MM HG: CPT | Performed by: INTERNAL MEDICINE

## 2025-06-11 ASSESSMENT — ENCOUNTER SYMPTOMS
COUGH: 0
LOSS OF SENSATION IN FEET: 0
PARESTHESIAS: 0
PALPITATIONS: 0
DYSPNEA ON EXERTION: 0
ABDOMINAL PAIN: 0
NUMBNESS: 0
SHORTNESS OF BREATH: 0
HEMATURIA: 0
DYSURIA: 0
BLURRED VISION: 0
OCCASIONAL FEELINGS OF UNSTEADINESS: 0
DEPRESSION: 0

## 2025-06-11 ASSESSMENT — PATIENT HEALTH QUESTIONNAIRE - PHQ9
1. LITTLE INTEREST OR PLEASURE IN DOING THINGS: NOT AT ALL
SUM OF ALL RESPONSES TO PHQ9 QUESTIONS 1 AND 2: 0
2. FEELING DOWN, DEPRESSED OR HOPELESS: NOT AT ALL

## 2025-06-11 ASSESSMENT — LIFESTYLE VARIABLES: TOTAL SCORE: 0

## 2025-06-11 ASSESSMENT — PAIN SCALES - GENERAL: PAINLEVEL_OUTOF10: 0-NO PAIN

## 2025-06-11 NOTE — ASSESSMENT & PLAN NOTE
No symptomatic recurrences of the SVT.  Will continue to follow on a clinical basis on no antiarrhythmic.

## 2025-06-11 NOTE — ASSESSMENT & PLAN NOTE
Reviewed lipid panel: Total cholesterol 154, triglycerides 121, HDL 71 and LDL 63.  I believe this is a reasonable reduction in LDL cholesterol, continue the rosuvastatin and plan on rechecking lipid panel approximately May 2026.   Cooperative

## 2025-06-11 NOTE — ASSESSMENT & PLAN NOTE
No typical anginal type symptoms.  We will continue standard risk factor modification and follow on a clinical basis.  She did have atypical axillary discomfort yesterday but it was very mild.  We just follow clinically and I told her to call me if she has recurrent episodes and then would schedule stress testing.

## 2025-06-11 NOTE — PROGRESS NOTES
Subjective   Uyen Naranjo is a 70 y.o. female.    Chief Complaint:  6 month f/u    HPI  70-year-old female with a history of an elevated coronary artery calcium score hypertension hyperlipidemia reports for general cardiac follow-up visit.  Overall doing well.  Although yesterday she had a slight ache in her left axillary area which lasted for about an hour or so and then resolved.  Not in the chest area more axillary and she has had no reoccurrences and the discomfort was very mild.  On her last visit there was concern that she may have had a TIA secondary to some ocular symptoms.  But she was seen by neurologist at the [facility] who felt it was an ocular migraine and not a TIA.  She has had no reoccurrences.  No palpitations or recurrences of the SVT as well.    Review of Systems   Constitutional: Negative for malaise/fatigue.   HENT:  Negative for congestion.    Eyes:  Negative for blurred vision.   Cardiovascular:  Negative for chest pain, dyspnea on exertion and palpitations.   Respiratory:  Negative for cough and shortness of breath.    Musculoskeletal:  Negative for joint pain.   Gastrointestinal:  Negative for abdominal pain.   Genitourinary:  Negative for dysuria and hematuria.   Neurological:  Negative for numbness and paresthesias.       Objective   Constitutional:       Appearance: Not in distress.   Eyes:      Conjunctiva/sclera: Conjunctivae normal.   Neck:      Vascular: JVD normal.   Pulmonary:      Breath sounds: Normal breath sounds. No wheezing. No rhonchi. No rales.   Cardiovascular:      Normal rate. Regular rhythm.      Murmurs: There is no murmur.      No gallop.  No click. No rub.   Abdominal:      Palpations: Abdomen is soft.   Neurological:      General: No focal deficit present.      Mental Status: Alert.         Lab Review:   Orders Only on 05/30/2025   Component Date Value    CHOLESTEROL, TOTAL 05/30/2025 154     HDL CHOLESTEROL 05/30/2025 71     TRIGLYCERIDES 05/30/2025  121     LDL-CHOLESTEROL 05/30/2025 63     CHOL/HDLC RATIO 05/30/2025 2.2     NON HDL CHOLESTEROL 05/30/2025 83        Assessment/Plan   The primary encounter diagnosis was SVT (supraventricular tachycardia). Diagnoses of Elevated coronary artery calcium score, Benign essential hypertension, Mixed hyperlipidemia, Cardiac device in situ, and TIA (transient ischemic attack) were also pertinent to this visit.    SVT (supraventricular tachycardia)  No symptomatic recurrences of the SVT.  Will continue to follow on a clinical basis on no antiarrhythmic.    Hyperlipidemia  Reviewed lipid panel: Total cholesterol 154, triglycerides 121, HDL 71 and LDL 63.  I believe this is a reasonable reduction in LDL cholesterol, continue the rosuvastatin and plan on rechecking lipid panel approximately May 2026.    Elevated coronary artery calcium score  No typical anginal type symptoms.  We will continue standard risk factor modification and follow on a clinical basis.  She did have atypical axillary discomfort yesterday but it was very mild.  We just follow clinically and I told her to call me if she has recurrent episodes and then would schedule stress testing.    Benign essential hypertension  Blood pressure very well-controlled, no change necessary.

## 2025-11-06 ENCOUNTER — APPOINTMENT (OUTPATIENT)
Dept: OTOLARYNGOLOGY | Facility: CLINIC | Age: 70
End: 2025-11-06
Payer: MEDICARE

## 2025-11-13 ENCOUNTER — APPOINTMENT (OUTPATIENT)
Dept: OTOLARYNGOLOGY | Facility: CLINIC | Age: 70
End: 2025-11-13
Payer: MEDICARE

## 2025-11-24 ENCOUNTER — APPOINTMENT (OUTPATIENT)
Dept: OPHTHALMOLOGY | Facility: CLINIC | Age: 70
End: 2025-11-24
Payer: MEDICARE